# Patient Record
Sex: MALE | Race: WHITE | NOT HISPANIC OR LATINO | URBAN - METROPOLITAN AREA
[De-identification: names, ages, dates, MRNs, and addresses within clinical notes are randomized per-mention and may not be internally consistent; named-entity substitution may affect disease eponyms.]

---

## 2019-12-17 LAB
CHOLEST SERPL-MCNC: 193 MG/DL
HDLC SERPL-MCNC: 60 MG/DL
HIV 1+2 AB+HIV1 P24 AG SERPL QL IA: NONREACTIVE
LDLC SERPL CALC-MCNC: 108 MG/DL
NONHDLC SERPL-MCNC: 133 MG/DL
TRIGL SERPL-MCNC: 123 MG/DL

## 2021-04-07 NOTE — PROGRESS NOTES
ASSESSMENT AND PLAN:     COUNSELING:   Reviewed preventive health counseling, as reflected in patient instructions       Regular exercise       Healthy diet/nutrition       Consider Hep C screening for all patients one time for ages 18-79 years    (Z00.00) Visit for well man health check  (primary encounter diagnosis)  Comment: Age appropriate screening and preventive services provided. Up to date on cholesterol screening. Had borderline fasting glucose in 2019 - will repeat. We discussed health risks associated with BMI >30, also that BMI is an imprecise marker of health. I think his usual weight range of 185-200 will likely be okay unless his glucose remains elevated  His BP was in the Stage 1 hypertension range but was normal in 2019. We discussed weighs of lowering BP including weight loss, exercise, healthy diet, reducing sodium intake, increasing potassium intake.   He's interested in counseling around handling emotions with his daughter. Gave [facility] [place] info.  Plan: Basic metabolic panel, Hepatitis C Screen         Reflex to HCV RNA Quant and Genotype         Henrik Babb MD  [facility]  2021, 4:30 PM      SUBJECTIVE:   Yuniel is a 36 year old male who presents to clinic today to establish care and for annual wellness exam.     # Health Maintenance  - HIV Screenin19 nonreactive  - Hep C Screening: do with next labs  - BP: 19 118/68  BP Readings from Last 3 Encounters:   21 129/84   - Cholesterol: 19 Chol 193, HDL 60, , , non-  - Diabetes Screenin19 fasting glucose 100  - (+) seatbelt use, (+) helmet, (+) smoke detector  - Exercise: averages 27,000-30,000 steps a day mostly with work, also does elliptical 2-3 times a week (30 minutes at a time).   - tries to be vegetable and protein heavy (usually chicken or seafood, occasional beef/pork), tries to limit carb intake  - weight tends to be 185-200 depending on how strict on his diet he  is  - was on pills for weight loss in 2018    Today's PHQ-2 Score:   PHQ-2 ( 1999 [employer]) 4/9/2021   Q1: Little interest or pleasure in doing things 0   Q2: Feeling down, depressed or hopeless 0   PHQ-2 Score 0     Review of Systems:   Constitutional - no fevers, chills, night sweats, unintentional weight loss/gain   Eyes - no vision concerns   Ears/Nose/Throat - no hearing concerns, no dysphagia/odynophagia   Cardiovascular - no chest pain, palpitations   Pulmonary - no shortness of breath, wheezing, coughing   GI - no abdominal pain, constipation, diarrhea, nausea, vomiting    - no dysuria, polyuria, hematuria   Musculoskeletal - no joint or muscle pain  Integument - no rash   Neuro - no weakness, numbness, paresthesia   Heme - no easy bruising/bleeding   Endocrine - no polyuria, weight loss/gain, dry skin, excessive sweating, hair loss   Psychiatric - no feelings of depressed mood or anhedonia in past 2 weeks   Allergic/Immunologic - no history of anaphylaxis, no history of recurrent infections    History reviewed. No pertinent past medical history.  Past Surgical History:   Procedure Laterality Date     AS OPEN TX FRACTURE PHALANX/PHALANGES NOT GREAT TOE Right     Right 5th finger, may still have hardware in place     Family History   Problem Relation Age of Onset     No Known Problems Mother      Diabetes Type 1 Father      Hyperlipidemia Father      Nephrolithiasis Father      Diabetes Type 2  Paternal Grandmother      Coronary Artery Disease Paternal Grandfather 45     Spina bifida Sister      Hydrocephalus Sister      Prostate Cancer No family hx of      Colon Cancer No family hx of      Social History     Tobacco Use     Smoking status: Never Smoker     Smokeless tobacco: Never Used   Substance Use Topics     Alcohol use: Yes     Frequency: 2-3 times a week     Drinks per session: 1 or 2     Binge frequency: Never     Drug use: Never     Social History     Social History Narrative    Works at [employer], stocking and loading trucks    Lives with wife and daughter       Current Outpatient Medications   Medication     CALCIUM PO     magnesium 250 MG tablet     multivitamin w/minerals (MULTI-VITAMIN) tablet     vitamin (B COMPLEX-C) tablet     Vitamin D, Cholecalciferol, 25 MCG (1000 UT) CAPS     No current facility-administered medications for this visit.      I have reviewed the patient's past medical, surgical, family, and social history.     OBJECTIVE:   /84 (BP Location: Right arm, Patient Position: Sitting, Cuff Size: Adult Large)   Pulse 69   Temp 97  F (36.1  C) (Skin)   Resp 14   Ht 1.738 m (5' 8.43\")   Wt 92.5 kg (204 lb)   SpO2 98%   BMI 30.63 kg/m      Constitutional: well-appearing, appears stated age  Eyes: conjunctivae without erythema, sclera anicteric. Pupils equal, round, and reactive to light.   ENT: oropharynx clear, TM grey bilateral  Cardiac: regular rate and rhythm, normal S1/S2, no murmur/rubs/gallops  Respiratory: lungs clear to auscultation bilaterally, normal work of breathing, no wheezes/crackles  GI: abdomen soft, non-tender, non-distended  Extremities: warm and well perfused, radial pulses 2+ and equal, cap refill brisk.  Lymph: no cervical or supraclavicular lymphadenopathy  Skin: no rashes, lesions, or wounds  Psych: affect is full and appropriate, speech is fluent and non-pressured  "

## 2021-04-09 ENCOUNTER — OFFICE VISIT (OUTPATIENT)
Dept: FAMILY MEDICINE | Facility: CLINIC | Age: 36
End: 2021-04-09
Payer: COMMERCIAL

## 2021-04-09 VITALS
OXYGEN SATURATION: 98 % | DIASTOLIC BLOOD PRESSURE: 84 MMHG | BODY MASS INDEX: 30.92 KG/M2 | RESPIRATION RATE: 14 BRPM | TEMPERATURE: 97 F | WEIGHT: 204 LBS | HEART RATE: 69 BPM | SYSTOLIC BLOOD PRESSURE: 129 MMHG | HEIGHT: 68 IN

## 2021-04-09 DIAGNOSIS — Z00.00 VISIT FOR WELL MAN HEALTH CHECK: Primary | ICD-10-CM

## 2021-04-09 RX ORDER — MULTIVITAMIN WITH IRON
1 TABLET ORAL DAILY
COMMUNITY

## 2021-04-09 RX ORDER — MULTIPLE VITAMINS W/ MINERALS TAB 9MG-400MCG
1 TAB ORAL DAILY
COMMUNITY

## 2021-04-09 RX ORDER — CALCIUM POLYCARBOPHIL 625 MG
TABLET ORAL DAILY
COMMUNITY
End: 2021-04-09

## 2021-04-09 RX ORDER — MULTIVITAMIN WITH IRON
1 TABLET ORAL DAILY
COMMUNITY
End: 2022-04-23

## 2021-04-09 RX ORDER — FAMOTIDINE 20 MG
TABLET ORAL
COMMUNITY
End: 2022-04-23

## 2021-04-09 SDOH — HEALTH STABILITY: MENTAL HEALTH: HOW OFTEN DO YOU HAVE 6 OR MORE DRINKS ON ONE OCCASION?: NEVER

## 2021-04-09 SDOH — HEALTH STABILITY: MENTAL HEALTH: HOW OFTEN DO YOU HAVE A DRINK CONTAINING ALCOHOL?: 2-3 TIMES A WEEK

## 2021-04-09 SDOH — HEALTH STABILITY: MENTAL HEALTH: HOW MANY STANDARD DRINKS CONTAINING ALCOHOL DO YOU HAVE ON A TYPICAL DAY?: 1 OR 2

## 2021-04-09 ASSESSMENT — MIFFLIN-ST. JEOR: SCORE: 1831.59

## 2021-04-09 NOTE — NURSING NOTE
"36 year old  Chief Complaint   Patient presents with     Physical       Blood pressure 129/84, pulse 69, temperature 97  F (36.1  C), temperature source Skin, resp. rate 14, height 1.738 m (5' 8.43\"), weight 92.5 kg (204 lb), SpO2 98 %. Body mass index is 30.63 kg/m .  There is no problem list on file for this patient.      Wt Readings from Last 2 Encounters:   04/09/21 92.5 kg (204 lb)     BP Readings from Last 3 Encounters:   04/09/21 129/84         Current Outpatient Medications   Medication     Calcium Polycarbophil (FIBER) 625 MG tablet     magnesium 250 MG tablet     multivitamin w/minerals (MULTI-VITAMIN) tablet     vitamin (B COMPLEX-C) tablet     Vitamin D, Cholecalciferol, 25 MCG (1000 UT) CAPS     No current facility-administered medications for this visit.        Social History     Tobacco Use     Smoking status: Never Smoker     Smokeless tobacco: Never Used   Substance Use Topics     Alcohol use: Yes     Frequency: 2-3 times a week     Drinks per session: 1 or 2     Binge frequency: Never     Drug use: Never       There are no preventive care reminders to display for this patient.    No results found for: PAP      April 9, 2021 4:01 PM    "

## 2021-04-22 ENCOUNTER — VIRTUAL VISIT (OUTPATIENT)
Dept: BEHAVIORAL HEALTH | Facility: CLINIC | Age: 36
End: 2021-04-22
Payer: COMMERCIAL

## 2021-04-22 DIAGNOSIS — F43.22 ADJUSTMENT DISORDER WITH ANXIETY: Primary | ICD-10-CM

## 2021-04-22 ASSESSMENT — COLUMBIA-SUICIDE SEVERITY RATING SCALE - C-SSRS
1. IN THE PAST MONTH, HAVE YOU WISHED YOU WERE DEAD OR WISHED YOU COULD GO TO SLEEP AND NOT WAKE UP?: NO
6. HAVE YOU EVER DONE ANYTHING, STARTED TO DO ANYTHING, OR PREPARED TO DO ANYTHING TO END YOUR LIFE?: NO
TOTAL  NUMBER OF INTERRUPTED ATTEMPTS LIFETIME: NO
3. HAVE YOU BEEN THINKING ABOUT HOW YOU MIGHT KILL YOURSELF?: NO
2. HAVE YOU ACTUALLY HAD ANY THOUGHTS OF KILLING YOURSELF?: NO
6. HAVE YOU EVER DONE ANYTHING, STARTED TO DO ANYTHING, OR PREPARED TO DO ANYTHING TO END YOUR LIFE?: NO
5. HAVE YOU STARTED TO WORK OUT OR WORKED OUT THE DETAILS OF HOW TO KILL YOURSELF? DO YOU INTEND TO CARRY OUT THIS PLAN?: NO
TOTAL  NUMBER OF INTERRUPTED ATTEMPTS PAST 3 MONTHS: NO
2. HAVE YOU ACTUALLY HAD ANY THOUGHTS OF KILLING YOURSELF LIFETIME?: NO
4. HAVE YOU HAD THESE THOUGHTS AND HAD SOME INTENTION OF ACTING ON THEM?: NO
ATTEMPT PAST THREE MONTHS: NO
ATTEMPT LIFETIME: NO
TOTAL  NUMBER OF ABORTED OR SELF INTERRUPTED ATTEMPTS PAST LIFETIME: NO
1. IN THE PAST MONTH, HAVE YOU WISHED YOU WERE DEAD OR WISHED YOU COULD GO TO SLEEP AND NOT WAKE UP?: NO
5. HAVE YOU STARTED TO WORK OUT OR WORKED OUT THE DETAILS OF HOW TO KILL YOURSELF? DO YOU INTEND TO CARRY OUT THIS PLAN?: NO
TOTAL  NUMBER OF ABORTED OR SELF INTERRUPTED ATTEMPTS PAST 3 MONTHS: NO
4. HAVE YOU HAD THESE THOUGHTS AND HAD SOME INTENTION OF ACTING ON THEM?: NO

## 2021-04-22 ASSESSMENT — ANXIETY QUESTIONNAIRES
5. BEING SO RESTLESS THAT IT IS HARD TO SIT STILL: NOT AT ALL
GAD7 TOTAL SCORE: 3
7. FEELING AFRAID AS IF SOMETHING AWFUL MIGHT HAPPEN: NOT AT ALL
IF YOU CHECKED OFF ANY PROBLEMS ON THIS QUESTIONNAIRE, HOW DIFFICULT HAVE THESE PROBLEMS MADE IT FOR YOU TO DO YOUR WORK, TAKE CARE OF THINGS AT HOME, OR GET ALONG WITH OTHER PEOPLE: NOT DIFFICULT AT ALL
1. FEELING NERVOUS, ANXIOUS, OR ON EDGE: SEVERAL DAYS
3. WORRYING TOO MUCH ABOUT DIFFERENT THINGS: NOT AT ALL
2. NOT BEING ABLE TO STOP OR CONTROL WORRYING: NOT AT ALL
6. BECOMING EASILY ANNOYED OR IRRITABLE: SEVERAL DAYS

## 2021-04-22 ASSESSMENT — PATIENT HEALTH QUESTIONNAIRE - PHQ9
SUM OF ALL RESPONSES TO PHQ QUESTIONS 1-9: 1
5. POOR APPETITE OR OVEREATING: SEVERAL DAYS
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
## 2021-04-23 ASSESSMENT — ANXIETY QUESTIONNAIRES: GAD7 TOTAL SCORE: 3

## 2021-04-24 DIAGNOSIS — Z00.00 VISIT FOR WELL MAN HEALTH CHECK: ICD-10-CM

## 2021-04-24 LAB
ANION GAP SERPL CALCULATED.3IONS-SCNC: 4 MMOL/L (ref 3–14)
BUN SERPL-MCNC: 18 MG/DL (ref 7–30)
CALCIUM SERPL-MCNC: 9 MG/DL (ref 8.5–10.1)
CHLORIDE SERPL-SCNC: 108 MMOL/L (ref 94–109)
CO2 SERPL-SCNC: 27 MMOL/L (ref 20–32)
CREAT SERPL-MCNC: 0.83 MG/DL (ref 0.66–1.25)
GFR SERPL CREATININE-BSD FRML MDRD: >90 ML/MIN/{1.73_M2}
GLUCOSE SERPL-MCNC: 91 MG/DL (ref 70–99)
POTASSIUM SERPL-SCNC: 4.1 MMOL/L (ref 3.4–5.3)
SODIUM SERPL-SCNC: 139 MMOL/L (ref 133–144)

## 2021-04-24 PROCEDURE — 36415 COLL VENOUS BLD VENIPUNCTURE: CPT | Performed by: FAMILY MEDICINE

## 2021-04-24 PROCEDURE — 86803 HEPATITIS C AB TEST: CPT | Performed by: FAMILY MEDICINE

## 2021-04-24 PROCEDURE — 80048 BASIC METABOLIC PNL TOTAL CA: CPT | Performed by: FAMILY MEDICINE

## 2021-04-27 LAB — HCV AB SERPL QL IA: NONREACTIVE

## 2021-05-06 ENCOUNTER — VIRTUAL VISIT (OUTPATIENT)
Dept: BEHAVIORAL HEALTH | Facility: CLINIC | Age: 36
End: 2021-05-06
Payer: COMMERCIAL

## 2021-05-06 DIAGNOSIS — F43.22 ADJUSTMENT DISORDER WITH ANXIETY: Primary | ICD-10-CM

## 2021-05-06 ASSESSMENT — ANXIETY QUESTIONNAIRES
1. FEELING NERVOUS, ANXIOUS, OR ON EDGE: SEVERAL DAYS
5. BEING SO RESTLESS THAT IT IS HARD TO SIT STILL: NOT AT ALL
GAD7 TOTAL SCORE: 1
7. FEELING AFRAID AS IF SOMETHING AWFUL MIGHT HAPPEN: NOT AT ALL
6. BECOMING EASILY ANNOYED OR IRRITABLE: NOT AT ALL
GAD7 TOTAL SCORE: 1
4. TROUBLE RELAXING: NOT AT ALL
2. NOT BEING ABLE TO STOP OR CONTROL WORRYING: NOT AT ALL
3. WORRYING TOO MUCH ABOUT DIFFERENT THINGS: NOT AT ALL
GAD7 TOTAL SCORE: 1
7. FEELING AFRAID AS IF SOMETHING AWFUL MIGHT HAPPEN: NOT AT ALL

## 2021-05-06 ASSESSMENT — PATIENT HEALTH QUESTIONNAIRE - PHQ9
10. IF YOU CHECKED OFF ANY PROBLEMS, HOW DIFFICULT HAVE THESE PROBLEMS MADE IT FOR YOU TO DO YOUR WORK, TAKE CARE OF THINGS AT HOME, OR GET ALONG WITH OTHER PEOPLE: NOT DIFFICULT AT ALL
SUM OF ALL RESPONSES TO PHQ QUESTIONS 1-9: 1
SUM OF ALL RESPONSES TO PHQ QUESTIONS 1-9: 1
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
## 2021-05-07 ASSESSMENT — PATIENT HEALTH QUESTIONNAIRE - PHQ9: SUM OF ALL RESPONSES TO PHQ QUESTIONS 1-9: 1

## 2021-05-07 ASSESSMENT — ANXIETY QUESTIONNAIRES: GAD7 TOTAL SCORE: 1

## 2021-05-13 ENCOUNTER — VIRTUAL VISIT (OUTPATIENT)
Dept: BEHAVIORAL HEALTH | Facility: CLINIC | Age: 36
End: 2021-05-13
Payer: COMMERCIAL

## 2021-05-13 DIAGNOSIS — F43.22 ADJUSTMENT DISORDER WITH ANXIOUS MOOD: Primary | ICD-10-CM

## 2021-05-13 ASSESSMENT — PATIENT HEALTH QUESTIONNAIRE - PHQ9
10. IF YOU CHECKED OFF ANY PROBLEMS, HOW DIFFICULT HAVE THESE PROBLEMS MADE IT FOR YOU TO DO YOUR WORK, TAKE CARE OF THINGS AT HOME, OR GET ALONG WITH OTHER PEOPLE: NOT DIFFICULT AT ALL
SUM OF ALL RESPONSES TO PHQ QUESTIONS 1-9: 0
SUM OF ALL RESPONSES TO PHQ QUESTIONS 1-9: 0
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
## 2021-05-14 ASSESSMENT — PATIENT HEALTH QUESTIONNAIRE - PHQ9: SUM OF ALL RESPONSES TO PHQ QUESTIONS 1-9: 0

## 2021-05-27 ENCOUNTER — VIRTUAL VISIT (OUTPATIENT)
Dept: BEHAVIORAL HEALTH | Facility: CLINIC | Age: 36
End: 2021-05-27
Payer: COMMERCIAL

## 2021-05-27 DIAGNOSIS — F43.22 ADJUSTMENT DISORDER WITH ANXIETY: Primary | ICD-10-CM

## 2021-05-27 ASSESSMENT — ANXIETY QUESTIONNAIRES
GAD7 TOTAL SCORE: 0
6. BECOMING EASILY ANNOYED OR IRRITABLE: NOT AT ALL
1. FEELING NERVOUS, ANXIOUS, OR ON EDGE: NOT AT ALL
7. FEELING AFRAID AS IF SOMETHING AWFUL MIGHT HAPPEN: NOT AT ALL
5. BEING SO RESTLESS THAT IT IS HARD TO SIT STILL: NOT AT ALL
7. FEELING AFRAID AS IF SOMETHING AWFUL MIGHT HAPPEN: NOT AT ALL
GAD7 TOTAL SCORE: 0
3. WORRYING TOO MUCH ABOUT DIFFERENT THINGS: NOT AT ALL
2. NOT BEING ABLE TO STOP OR CONTROL WORRYING: NOT AT ALL
GAD7 TOTAL SCORE: 0
4. TROUBLE RELAXING: NOT AT ALL

## 2021-05-27 ASSESSMENT — PATIENT HEALTH QUESTIONNAIRE - PHQ9
SUM OF ALL RESPONSES TO PHQ QUESTIONS 1-9: 0
10. IF YOU CHECKED OFF ANY PROBLEMS, HOW DIFFICULT HAVE THESE PROBLEMS MADE IT FOR YOU TO DO YOUR WORK, TAKE CARE OF THINGS AT HOME, OR GET ALONG WITH OTHER PEOPLE: NOT DIFFICULT AT ALL
SUM OF ALL RESPONSES TO PHQ QUESTIONS 1-9: 0
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
## 2021-05-28 ASSESSMENT — ANXIETY QUESTIONNAIRES: GAD7 TOTAL SCORE: 0

## 2021-05-28 ASSESSMENT — PATIENT HEALTH QUESTIONNAIRE - PHQ9: SUM OF ALL RESPONSES TO PHQ QUESTIONS 1-9: 0
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
## 2021-06-10 ENCOUNTER — VIRTUAL VISIT (OUTPATIENT)
Dept: BEHAVIORAL HEALTH | Facility: CLINIC | Age: 36
End: 2021-06-10
Payer: COMMERCIAL

## 2021-06-10 DIAGNOSIS — F43.22 ADJUSTMENT DISORDER WITH ANXIETY: ICD-10-CM

## 2021-06-10 ASSESSMENT — ANXIETY QUESTIONNAIRES
3. WORRYING TOO MUCH ABOUT DIFFERENT THINGS: NOT AT ALL
GAD7 TOTAL SCORE: 0
4. TROUBLE RELAXING: NOT AT ALL
GAD7 TOTAL SCORE: 0
6. BECOMING EASILY ANNOYED OR IRRITABLE: NOT AT ALL
2. NOT BEING ABLE TO STOP OR CONTROL WORRYING: NOT AT ALL
1. FEELING NERVOUS, ANXIOUS, OR ON EDGE: NOT AT ALL
7. FEELING AFRAID AS IF SOMETHING AWFUL MIGHT HAPPEN: NOT AT ALL
7. FEELING AFRAID AS IF SOMETHING AWFUL MIGHT HAPPEN: NOT AT ALL
GAD7 TOTAL SCORE: 0
5. BEING SO RESTLESS THAT IT IS HARD TO SIT STILL: NOT AT ALL

## 2021-06-10 ASSESSMENT — PATIENT HEALTH QUESTIONNAIRE - PHQ9
SUM OF ALL RESPONSES TO PHQ QUESTIONS 1-9: 0
SUM OF ALL RESPONSES TO PHQ QUESTIONS 1-9: 0

## 2021-06-11 ASSESSMENT — ANXIETY QUESTIONNAIRES: GAD7 TOTAL SCORE: 0

## 2021-06-11 ASSESSMENT — PATIENT HEALTH QUESTIONNAIRE - PHQ9: SUM OF ALL RESPONSES TO PHQ QUESTIONS 1-9: 0
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
## 2021-07-08 ENCOUNTER — VIRTUAL VISIT (OUTPATIENT)
Dept: BEHAVIORAL HEALTH | Facility: CLINIC | Age: 36
End: 2021-07-08
Payer: COMMERCIAL

## 2021-07-08 DIAGNOSIS — F43.22 ADJUSTMENT DISORDER WITH ANXIETY: Primary | ICD-10-CM

## 2021-07-08 ASSESSMENT — ANXIETY QUESTIONNAIRES
4. TROUBLE RELAXING: SEVERAL DAYS
7. FEELING AFRAID AS IF SOMETHING AWFUL MIGHT HAPPEN: NOT AT ALL
5. BEING SO RESTLESS THAT IT IS HARD TO SIT STILL: NOT AT ALL
2. NOT BEING ABLE TO STOP OR CONTROL WORRYING: SEVERAL DAYS
1. FEELING NERVOUS, ANXIOUS, OR ON EDGE: SEVERAL DAYS
7. FEELING AFRAID AS IF SOMETHING AWFUL MIGHT HAPPEN: NOT AT ALL
3. WORRYING TOO MUCH ABOUT DIFFERENT THINGS: NOT AT ALL
GAD7 TOTAL SCORE: 3
6. BECOMING EASILY ANNOYED OR IRRITABLE: NOT AT ALL

## 2021-07-08 ASSESSMENT — PATIENT HEALTH QUESTIONNAIRE - PHQ9
SUM OF ALL RESPONSES TO PHQ QUESTIONS 1-9: 1
SUM OF ALL RESPONSES TO PHQ QUESTIONS 1-9: 1
10. IF YOU CHECKED OFF ANY PROBLEMS, HOW DIFFICULT HAVE THESE PROBLEMS MADE IT FOR YOU TO DO YOUR WORK, TAKE CARE OF THINGS AT HOME, OR GET ALONG WITH OTHER PEOPLE: NOT DIFFICULT AT ALL

## 2021-07-09 ASSESSMENT — ANXIETY QUESTIONNAIRES: GAD7 TOTAL SCORE: 3

## 2021-07-09 ASSESSMENT — PATIENT HEALTH QUESTIONNAIRE - PHQ9: SUM OF ALL RESPONSES TO PHQ QUESTIONS 1-9: 1

## 2021-07-16 ENCOUNTER — NURSE TRIAGE (OUTPATIENT)
Dept: NURSING | Facility: CLINIC | Age: 36
End: 2021-07-16
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
## 2021-07-19 ASSESSMENT — ANXIETY QUESTIONNAIRES
6. BECOMING EASILY ANNOYED OR IRRITABLE: NOT AT ALL
5. BEING SO RESTLESS THAT IT IS HARD TO SIT STILL: NOT AT ALL
GAD7 TOTAL SCORE: 0
7. FEELING AFRAID AS IF SOMETHING AWFUL MIGHT HAPPEN: NOT AT ALL
8. IF YOU CHECKED OFF ANY PROBLEMS, HOW DIFFICULT HAVE THESE MADE IT FOR YOU TO DO YOUR WORK, TAKE CARE OF THINGS AT HOME, OR GET ALONG WITH OTHER PEOPLE?: NOT DIFFICULT AT ALL
GAD7 TOTAL SCORE: 0
2. NOT BEING ABLE TO STOP OR CONTROL WORRYING: NOT AT ALL
7. FEELING AFRAID AS IF SOMETHING AWFUL MIGHT HAPPEN: NOT AT ALL
1. FEELING NERVOUS, ANXIOUS, OR ON EDGE: NOT AT ALL
GAD7 TOTAL SCORE: 0
3. WORRYING TOO MUCH ABOUT DIFFERENT THINGS: NOT AT ALL
4. TROUBLE RELAXING: NOT AT ALL

## 2021-07-19 ASSESSMENT — PATIENT HEALTH QUESTIONNAIRE - PHQ9
SUM OF ALL RESPONSES TO PHQ QUESTIONS 1-9: 2
SUM OF ALL RESPONSES TO PHQ QUESTIONS 1-9: 2
10. IF YOU CHECKED OFF ANY PROBLEMS, HOW DIFFICULT HAVE THESE PROBLEMS MADE IT FOR YOU TO DO YOUR WORK, TAKE CARE OF THINGS AT HOME, OR GET ALONG WITH OTHER PEOPLE: NOT DIFFICULT AT ALL
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
## 2021-07-20 ENCOUNTER — VIRTUAL VISIT (OUTPATIENT)
Dept: BEHAVIORAL HEALTH | Facility: CLINIC | Age: 36
End: 2021-07-20
Payer: COMMERCIAL

## 2021-07-20 DIAGNOSIS — F43.22 ADJUSTMENT DISORDER WITH ANXIETY: Primary | ICD-10-CM

## 2021-07-20 ASSESSMENT — PATIENT HEALTH QUESTIONNAIRE - PHQ9: SUM OF ALL RESPONSES TO PHQ QUESTIONS 1-9: 2

## 2021-07-20 ASSESSMENT — ANXIETY QUESTIONNAIRES: GAD7 TOTAL SCORE: 0
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
## 2021-07-30 ENCOUNTER — VIRTUAL VISIT (OUTPATIENT)
Dept: BEHAVIORAL HEALTH | Facility: CLINIC | Age: 36
End: 2021-07-30
Payer: COMMERCIAL

## 2021-07-30 DIAGNOSIS — F43.22 ADJUSTMENT DISORDER WITH ANXIETY: Primary | ICD-10-CM

## 2021-07-31 ASSESSMENT — PATIENT HEALTH QUESTIONNAIRE - PHQ9: SUM OF ALL RESPONSES TO PHQ QUESTIONS 1-9: 0

## 2021-08-02 ENCOUNTER — TELEPHONE (OUTPATIENT)
Dept: FAMILY MEDICINE | Facility: CLINIC | Age: 36
End: 2021-08-02

## 2021-08-02 NOTE — TELEPHONE ENCOUNTER
Patient quit his job and is applying for unemployment. There is no requirement from unemployment office for a letter. Advised patient to apply for his unemployment as directed by the application process. No letter at this time.   Ella Macedo MS RN-BC  08/02/21  1:54 PM

## 2021-08-02 NOTE — TELEPHONE ENCOUNTER
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 He is wondering if we are able to write a brief letter stating that he is not able to hold a full time job at this time due to increased need in care for her. Call him with questions.    [place]
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
## 2021-08-12 ENCOUNTER — VIRTUAL VISIT (OUTPATIENT)
Dept: BEHAVIORAL HEALTH | Facility: CLINIC | Age: 36
End: 2021-08-12
Payer: COMMERCIAL

## 2021-08-12 DIAGNOSIS — F43.22 ADJUSTMENT DISORDER WITH ANXIETY: Primary | ICD-10-CM

## 2021-08-12 ASSESSMENT — ANXIETY QUESTIONNAIRES
GAD7 TOTAL SCORE: 0
7. FEELING AFRAID AS IF SOMETHING AWFUL MIGHT HAPPEN: NOT AT ALL
2. NOT BEING ABLE TO STOP OR CONTROL WORRYING: NOT AT ALL
1. FEELING NERVOUS, ANXIOUS, OR ON EDGE: NOT AT ALL
4. TROUBLE RELAXING: NOT AT ALL
GAD7 TOTAL SCORE: 0
3. WORRYING TOO MUCH ABOUT DIFFERENT THINGS: NOT AT ALL
GAD7 TOTAL SCORE: 0
6. BECOMING EASILY ANNOYED OR IRRITABLE: NOT AT ALL
5. BEING SO RESTLESS THAT IT IS HARD TO SIT STILL: NOT AT ALL
7. FEELING AFRAID AS IF SOMETHING AWFUL MIGHT HAPPEN: NOT AT ALL

## 2021-08-12 ASSESSMENT — PATIENT HEALTH QUESTIONNAIRE - PHQ9
SUM OF ALL RESPONSES TO PHQ QUESTIONS 1-9: 1
SUM OF ALL RESPONSES TO PHQ QUESTIONS 1-9: 1

## 2021-08-13 ASSESSMENT — ANXIETY QUESTIONNAIRES: GAD7 TOTAL SCORE: 0

## 2021-08-13 ASSESSMENT — PATIENT HEALTH QUESTIONNAIRE - PHQ9: SUM OF ALL RESPONSES TO PHQ QUESTIONS 1-9: 1
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
## 2021-08-17 ENCOUNTER — VIRTUAL VISIT (OUTPATIENT)
Dept: BEHAVIORAL HEALTH | Facility: CLINIC | Age: 36
End: 2021-08-17
Payer: COMMERCIAL

## 2021-08-17 DIAGNOSIS — F43.22 ADJUSTMENT DISORDER WITH ANXIETY: Primary | ICD-10-CM

## 2021-08-17 ASSESSMENT — PATIENT HEALTH QUESTIONNAIRE - PHQ9
SUM OF ALL RESPONSES TO PHQ QUESTIONS 1-9: 0
SUM OF ALL RESPONSES TO PHQ QUESTIONS 1-9: 0

## 2021-08-18 ASSESSMENT — PATIENT HEALTH QUESTIONNAIRE - PHQ9: SUM OF ALL RESPONSES TO PHQ QUESTIONS 1-9: 0
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
## 2021-09-14 ENCOUNTER — VIRTUAL VISIT (OUTPATIENT)
Dept: BEHAVIORAL HEALTH | Facility: CLINIC | Age: 36
End: 2021-09-14
Payer: COMMERCIAL

## 2021-09-14 DIAGNOSIS — F43.22 ADJUSTMENT DISORDER WITH ANXIETY: Primary | ICD-10-CM

## 2021-09-14 ASSESSMENT — PATIENT HEALTH QUESTIONNAIRE - PHQ9
SUM OF ALL RESPONSES TO PHQ QUESTIONS 1-9: 1
10. IF YOU CHECKED OFF ANY PROBLEMS, HOW DIFFICULT HAVE THESE PROBLEMS MADE IT FOR YOU TO DO YOUR WORK, TAKE CARE OF THINGS AT HOME, OR GET ALONG WITH OTHER PEOPLE: NOT DIFFICULT AT ALL
SUM OF ALL RESPONSES TO PHQ QUESTIONS 1-9: 1

## 2021-09-14 ASSESSMENT — ANXIETY QUESTIONNAIRES
7. FEELING AFRAID AS IF SOMETHING AWFUL MIGHT HAPPEN: NOT AT ALL
GAD7 TOTAL SCORE: 0
7. FEELING AFRAID AS IF SOMETHING AWFUL MIGHT HAPPEN: NOT AT ALL
GAD7 TOTAL SCORE: 0
GAD7 TOTAL SCORE: 0
4. TROUBLE RELAXING: NOT AT ALL
1. FEELING NERVOUS, ANXIOUS, OR ON EDGE: NOT AT ALL
2. NOT BEING ABLE TO STOP OR CONTROL WORRYING: NOT AT ALL
5. BEING SO RESTLESS THAT IT IS HARD TO SIT STILL: NOT AT ALL
3. WORRYING TOO MUCH ABOUT DIFFERENT THINGS: NOT AT ALL
6. BECOMING EASILY ANNOYED OR IRRITABLE: NOT AT ALL
8. IF YOU CHECKED OFF ANY PROBLEMS, HOW DIFFICULT HAVE THESE MADE IT FOR YOU TO DO YOUR WORK, TAKE CARE OF THINGS AT HOME, OR GET ALONG WITH OTHER PEOPLE?: NOT DIFFICULT AT ALL

## 2021-09-15 ASSESSMENT — ANXIETY QUESTIONNAIRES: GAD7 TOTAL SCORE: 0

## 2021-09-15 ASSESSMENT — PATIENT HEALTH QUESTIONNAIRE - PHQ9: SUM OF ALL RESPONSES TO PHQ QUESTIONS 1-9: 1

## 2021-10-17 ENCOUNTER — HEALTH MAINTENANCE LETTER (OUTPATIENT)
Age: 36
End: 2021-10-17

## 2021-11-08 ENCOUNTER — NURSE TRIAGE (OUTPATIENT)
Dept: FAMILY MEDICINE | Facility: CLINIC | Age: 36
End: 2021-11-08
Payer: COMMERCIAL

## 2021-11-08 NOTE — TELEPHONE ENCOUNTER
"Patient c/o severe lower back pain with onset yesterday after \"tweaking\" his back. He is unable to stand up, is lying on the floor because that is only place he can be. He rates pain as \"severe\" even after taking acetaminophen and ibuprofen. Denies loss of lbadder bowel control, numbness in groin or rectal area, abdominal pain, weakness of leg or foot. He reports that he is not able to get up to get medical help due to severity of pain.     Reason for Disposition    Unable to walk    Protocols used: BACK PAIN-A-    Patient plans to call 911 for transportation to ED.   Ella Macedo MS RN-BC  11/08/21  10:00 AM      "

## 2021-11-08 NOTE — TELEPHONE ENCOUNTER
Who is calling? Patient  Reason for Call: Requests to speak with a RN for severe back pain
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
## 2021-11-16 ENCOUNTER — VIRTUAL VISIT (OUTPATIENT)
Dept: BEHAVIORAL HEALTH | Facility: CLINIC | Age: 36
End: 2021-11-16
Payer: COMMERCIAL

## 2021-11-16 DIAGNOSIS — F43.22 ADJUSTMENT DISORDER WITH ANXIETY: Primary | ICD-10-CM

## 2021-11-16 ASSESSMENT — ANXIETY QUESTIONNAIRES
GAD7 TOTAL SCORE: 0
4. TROUBLE RELAXING: NOT AT ALL
GAD7 TOTAL SCORE: 0
7. FEELING AFRAID AS IF SOMETHING AWFUL MIGHT HAPPEN: NOT AT ALL
3. WORRYING TOO MUCH ABOUT DIFFERENT THINGS: NOT AT ALL
GAD7 TOTAL SCORE: 0
2. NOT BEING ABLE TO STOP OR CONTROL WORRYING: NOT AT ALL
7. FEELING AFRAID AS IF SOMETHING AWFUL MIGHT HAPPEN: NOT AT ALL
6. BECOMING EASILY ANNOYED OR IRRITABLE: NOT AT ALL
1. FEELING NERVOUS, ANXIOUS, OR ON EDGE: NOT AT ALL
5. BEING SO RESTLESS THAT IT IS HARD TO SIT STILL: NOT AT ALL

## 2021-11-16 ASSESSMENT — PATIENT HEALTH QUESTIONNAIRE - PHQ9
SUM OF ALL RESPONSES TO PHQ QUESTIONS 1-9: 0
SUM OF ALL RESPONSES TO PHQ QUESTIONS 1-9: 0

## 2021-11-17 ASSESSMENT — ANXIETY QUESTIONNAIRES: GAD7 TOTAL SCORE: 0

## 2021-11-17 ASSESSMENT — PATIENT HEALTH QUESTIONNAIRE - PHQ9: SUM OF ALL RESPONSES TO PHQ QUESTIONS 1-9: 0

## 2022-04-23 NOTE — PROGRESS NOTES
ASSESSMENT AND PLAN:     COUNSELING:   Reviewed preventive health counseling, as reflected in patient instructions       Regular exercise       Healthy diet/nutrition    (Z00.00) Visit for well man health check  (primary encounter diagnosis)  Comment: Age appropriate screening and preventive services provided.   Discussed dietician services to help with weight loss - Yuniel has been successful with weight loss on his own in the past and wishes to try this again first.   Plan:     (R03.0) Elevated BP without diagnosis of hypertension  Comment: Newly elevated from previous readings.  Advised purchase of home BP machine and monitoring pressures at home - see AVS.   Plan: Basic metabolic panel          (Z11.3) Routine screening for STI (sexually transmitted infection)  Comment:   Plan: NEISSERIA GONORRHOEA PCR, CHLAMYDIA TRACHOMATIS        PCR          (Z13.1) Screening for diabetes mellitus  Comment:   Plan: Hemoglobin A1c          (Z13.220) Screening cholesterol level  Comment:   Plan: Lipid panel reflex to direct LDL Fasting          (Z78.9) Vegetarian diet  Comment: Advised B12 supplementation, adequate iron stores. Gave informational handout on iron requirements and common sources.   Plan:     (D17.30) Lipoma of skin and subcutaneous tissue  Comment: Low back nodule is very likely lipoma. Neck nodule also likely lipoma but could be lymph node given the location. Advised monitoring for another 3 months. If doesn't change, I would get an ultrasound to confirm this isn't lymphadenopathy.   Plan:     Henrik Babb MD  [facility]  04/25/2022, 9:45 AM      SUBJECTIVE:   Yuniel Ellis is a 37 year old male who presents to clinic today for an annual wellness exam.    # Adjustment Disorder  - lots of life stressors with family in the past year  - had been following with [facility] Shawn Ullrich, stopped in November  -  from wife who is planning move back to [place]  - splitting custody but with  "the move, Yuniel will be the primary caregiver for their daughter  - in a new relationship, going well    - has put on a good amount of weight over the past year  - has not been able to regularly exercise with work, , stressors, etc.      # Skin Concern  - has noticed a \"bump\" on his posterior neck for a few months now    - has also been told he has a \"fatty tumor\" on his lower back by another doctor    # Health Maintenance  - HIV Screenin19 nonreactive  - Hep C Screenin21 Ab nonreactive  - BP:   BP Readings from Last 3 Encounters:   22 (!) 149/96   21 129/84   - Cholesterol:   Recent Labs   Lab Test 19  0000   CHOL 193   HDL 60      TRIG 123     - Diabetes Screenin21 Fasting Glucose 91  - Exercise: has a stationary bike in bedroom now - planning to start using it 2-3 times a week, 30 minutes at a time    - has switched to a plant-based diet the past 3 months for health reasons  - some cheese and eggs    Today's PHQ-2 Score:   PHQ-2 (  [employer]) 2022   Q1: Little interest or pleasure in doing things 0 0   Q2: Feeling down, depressed or hopeless 0 0   PHQ-2 Score 0 0   PHQ-2 Total Score (12-17 Years)- Positive if 3 or more points; Administer PHQ-A if positive - 0     Review of Systems:   Constitutional - no fevers, chills, night sweats, unintentional weight loss/gain   Eyes - no vision concerns   Ears/Nose/Throat - no hearing concerns, no dysphagia/odynophagia   Cardiovascular - no chest pain, palpitations   Pulmonary - no shortness of breath, wheezing, coughing   GI - no abdominal pain, constipation, diarrhea, nausea, vomiting    - no dysuria, polyuria, hematuria   Musculoskeletal - no joint or muscle pain  Integument -  As above   Neuro - no weakness, numbness, paresthesia   Heme - no easy bruising/bleeding   Endocrine - no polyuria, weight loss/gain, dry skin, excessive sweating, hair loss   Psychiatric - no feelings of depressed mood or " "anhedonia in past 2 weeks   Allergic/Immunologic - no history of anaphylaxis, no history of recurrent infections    History reviewed. No pertinent past medical history.  Past Surgical History:   Procedure Laterality Date     AS OPEN TX FRACTURE PHALANX/PHALANGES NOT GREAT TOE Right     Right 5th finger, may still have hardware in place     Family History   Problem Relation Age of Onset     No Known Problems Mother      Diabetes Type 1 Father      Hyperlipidemia Father      Nephrolithiasis Father      Spina bifida Sister      Hydrocephalus Sister      Diabetes Type 2  Paternal Grandmother      Coronary Artery Disease Paternal Grandfather 45     Prostate Cancer No family hx of      Colon Cancer No family hx of      Cerebrovascular Disease No family hx of      Social History     Tobacco Use     Smoking status: Never Smoker     Smokeless tobacco: Never Used   Vaping Use     Vaping Use: Never used   Substance Use Topics     Alcohol use: Yes     Alcohol/week: 4.0 standard drinks     Types: 4 Standard drinks or equivalent per week     Comment: 3-4 drinks a week, spread out     Drug use: Yes     Types: Marijuana     Comment: Edibles, a couple of times a month     Social History     Social History Narrative    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     I have reviewed the patient's past medical, surgical, family, and social history.     OBJECTIVE:   BP (!) 149/96 (BP Location: Right arm, Patient Position: Sitting, Cuff Size: Adult Large)   Pulse 95   Temp 98.4  F (36.9  C) (Skin)   Resp 13   Ht 1.734 m (5' 8.25\")   Wt 111.7 kg (246 lb 4 oz)   SpO2 98%   BMI 37.17 kg/m      Constitutional: well-appearing, appears stated age  Eyes: conjunctivae without " erythema, sclera anicteric. Pupils equal, round, and reactive to light.   ENT: oropharynx clear, TM grey bilateral  Cardiac: regular rate and rhythm, normal S1/S2, no murmur/rubs/gallops  Respiratory: lungs clear to auscultation bilaterally, normal work of breathing, no wheezes/crackles  GI: abdomen soft, non-tender, non-distended  Extremities: warm and well perfused, radial pulses 2+ and equal, cap refill brisk.  Lymph: no cervical or supraclavicular lymphadenopathy  Skin: 3cm, firm, rubbery, mobile nodule posterior neck to left of midline. 2cm firm rubbery mobile nodule right lower back. Both non-tender with no overlying skin changes.   Psych: affect is full and appropriate, speech is fluent and non-pressured

## 2022-04-25 ENCOUNTER — OFFICE VISIT (OUTPATIENT)
Dept: FAMILY MEDICINE | Facility: CLINIC | Age: 37
End: 2022-04-25
Payer: COMMERCIAL

## 2022-04-25 VITALS
BODY MASS INDEX: 37.32 KG/M2 | TEMPERATURE: 98.4 F | OXYGEN SATURATION: 98 % | WEIGHT: 246.25 LBS | HEIGHT: 68 IN | HEART RATE: 80 BPM | SYSTOLIC BLOOD PRESSURE: 142 MMHG | DIASTOLIC BLOOD PRESSURE: 88 MMHG | RESPIRATION RATE: 13 BRPM

## 2022-04-25 DIAGNOSIS — Z13.220 SCREENING CHOLESTEROL LEVEL: ICD-10-CM

## 2022-04-25 DIAGNOSIS — R03.0 ELEVATED BP WITHOUT DIAGNOSIS OF HYPERTENSION: ICD-10-CM

## 2022-04-25 DIAGNOSIS — D17.30 LIPOMA OF SKIN AND SUBCUTANEOUS TISSUE: ICD-10-CM

## 2022-04-25 DIAGNOSIS — Z00.00 VISIT FOR WELL MAN HEALTH CHECK: Primary | ICD-10-CM

## 2022-04-25 DIAGNOSIS — Z78.9 VEGETARIAN DIET: Chronic | ICD-10-CM

## 2022-04-25 DIAGNOSIS — Z11.3 ROUTINE SCREENING FOR STI (SEXUALLY TRANSMITTED INFECTION): ICD-10-CM

## 2022-04-25 DIAGNOSIS — Z13.1 SCREENING FOR DIABETES MELLITUS: ICD-10-CM

## 2022-04-25 PROBLEM — F43.22 ADJUSTMENT DISORDER WITH ANXIETY: Status: RESOLVED | Noted: 2021-06-10 | Resolved: 2022-04-25

## 2022-04-25 LAB
ANION GAP SERPL CALCULATED.3IONS-SCNC: 3 MMOL/L (ref 3–14)
BUN SERPL-MCNC: 11 MG/DL (ref 7–30)
CALCIUM SERPL-MCNC: 9 MG/DL (ref 8.5–10.1)
CHLORIDE BLD-SCNC: 109 MMOL/L (ref 94–109)
CHOLEST SERPL-MCNC: 206 MG/DL
CO2 SERPL-SCNC: 28 MMOL/L (ref 20–32)
CREAT SERPL-MCNC: 0.64 MG/DL (ref 0.66–1.25)
FASTING STATUS PATIENT QL REPORTED: NO
GFR SERPL CREATININE-BSD FRML MDRD: >90 ML/MIN/1.73M2
GLUCOSE BLD-MCNC: 96 MG/DL (ref 70–99)
HBA1C MFR BLD: 5.2 % (ref 0–5.6)
HDLC SERPL-MCNC: 45 MG/DL
LDLC SERPL CALC-MCNC: 124 MG/DL
NONHDLC SERPL-MCNC: 161 MG/DL
POTASSIUM BLD-SCNC: 4.5 MMOL/L (ref 3.4–5.3)
SODIUM SERPL-SCNC: 140 MMOL/L (ref 133–144)
TRIGL SERPL-MCNC: 185 MG/DL

## 2022-04-25 PROCEDURE — 87591 N.GONORRHOEAE DNA AMP PROB: CPT | Performed by: FAMILY MEDICINE

## 2022-04-25 PROCEDURE — 80061 LIPID PANEL: CPT | Performed by: FAMILY MEDICINE

## 2022-04-25 PROCEDURE — 87491 CHLMYD TRACH DNA AMP PROBE: CPT | Performed by: FAMILY MEDICINE

## 2022-04-25 PROCEDURE — 80048 BASIC METABOLIC PNL TOTAL CA: CPT | Performed by: FAMILY MEDICINE

## 2022-04-25 NOTE — PATIENT INSTRUCTIONS
After 3 months (so end of July), if you still have a lump on your neck, let me know and I'll order an ultrasound to see if this is a lipoma (fatty tumor) or a big lymph node.       I recommend you purchase a home blood pressure machine from the list of validated machines at validatebp.org.  The cheapest model on the list is probably the Omron 5 Series, which costs about $50.    I recommend you check your blood pressure every day for 7 days. Try to check 2-3 times in a row, a minute apart, and write down the numbers. Ideally, each day try to check at a different time of the day, since your blood pressure will change over the course of the day.     Please send me those numbers in [employer] after a week.     How to check your blood pressure at home:  - Make sure you use the correct size blood pressure cuff. Usually there will be symptoms on the cuff that will show you if it is too big or too small when you put it on your arm  - Put the cuff on your bare arm. Don't have clothes between the cuff and your arm  - Do not talk right before or during the check  - Have you legs uncrossed and feet flat on the floor  - Rest in the chair you are going to check your pressure in for at least 5 minutes before checking your pressure  - Have you arm supported while the machine is checking your pressure  - Make sure your bladder is empty before checking  - Don't exercise or have caffeine within 30 minutes of checking your pressure  - Check your pressure in both arms and see which one has a higher pressure. From then on, continue to check your pressure in the arm you know has the higher pressure.                https://ods.od.nih.gov/factsheets/Iron-HealthProfessional/

## 2022-04-25 NOTE — NURSING NOTE
"37 year old  Chief Complaint   Patient presents with     Physical     Mass     On neck x couple of months       Blood pressure (!) 149/96, pulse 95, temperature 98.4  F (36.9  C), temperature source Skin, resp. rate 13, height 1.734 m (5' 8.25\"), weight 111.7 kg (246 lb 4 oz), SpO2 98 %. Body mass index is 37.17 kg/m .  Patient Active Problem List   Diagnosis     Adjustment disorder with anxiety       Wt Readings from Last 2 Encounters:   04/25/22 111.7 kg (246 lb 4 oz)   04/09/21 92.5 kg (204 lb)     BP Readings from Last 3 Encounters:   04/25/22 (!) 149/96   04/09/21 129/84         Current Outpatient Medications   Medication     magnesium 250 MG tablet     multivitamin w/minerals (THERA-VIT-M) tablet     No current facility-administered medications for this visit.       Social History     Tobacco Use     Smoking status: Never Smoker     Smokeless tobacco: Never Used   Vaping Use     Vaping Use: Never used   Substance Use Topics     Alcohol use: Yes     Alcohol/week: 4.0 standard drinks     Types: 4 Standard drinks or equivalent per week     Drug use: Yes     Types: Other     Comment: Edibles       Health Maintenance Due   Topic Date Due     ADVANCE CARE PLANNING  Never done     INFLUENZA VACCINE (1) 09/01/2021     PREVENTIVE CARE VISIT  04/09/2022       No results found for: PAP      April 25, 2022 9:12 AM    "

## 2022-04-26 LAB
C TRACH DNA SPEC QL NAA+PROBE: NEGATIVE
N GONORRHOEA DNA SPEC QL NAA+PROBE: NEGATIVE

## 2022-05-10 ENCOUNTER — MYC MEDICAL ADVICE (OUTPATIENT)
Dept: FAMILY MEDICINE | Facility: CLINIC | Age: 37
End: 2022-05-10
Payer: COMMERCIAL

## 2022-05-10 DIAGNOSIS — I10 ESSENTIAL HYPERTENSION: ICD-10-CM

## 2022-05-16 PROBLEM — I10 ESSENTIAL HYPERTENSION: Status: ACTIVE | Noted: 2022-05-16

## 2022-05-16 NOTE — CONFIDENTIAL NOTE
Starting on hydrochlorothiazide for new diagnosis hypertension    Henrik Babb MD on 5/25/2022 at 4:26 PM

## 2022-05-25 RX ORDER — HYDROCHLOROTHIAZIDE 25 MG/1
25 TABLET ORAL DAILY
Qty: 90 TABLET | Refills: 0 | Status: SHIPPED | OUTPATIENT
Start: 2022-05-25 | End: 2022-08-24

## 2022-06-13 NOTE — PROGRESS NOTES
"[facility]DIR[facility]  Mary 15, 2022    *[facility] initiated appt via video as scheduled, but due to poor connectivity (delayed audio/video), pt requested to change appt to telephone.     Telemedicine Visit: The patient's condition can be safely assessed and treated via synchronous audio and visual telemedicine encounter.      Reason for Telemedicine Visit: Patient has requested telehealth visit    Originating Site (Patient Location): Patient's place of employment    Distant Site (Provider Location): [facility]    Consent:  The patient/guardian has verbally consented to: the potential risks and benefits of telemedicine (video visit) versus in person care; bill my insurance or make self-payment for services provided; and responsibility for payment of non-covered services.     Mode of Communication:  Video Conference via [employer]    As the provider I attest to compliance with applicable laws and regulations related to telemedicine.    Yuniel Ellis is a 37 year old male who is being evaluated via a telephone visit.      The patient has been notified of the following:     \"We have found that certain health care needs can be provided without the need for a face to face visit.  This service lets us provide the care you need with a short phone conversation.      I will have full access to your [facility] medical record during this entire phone call.   I will be taking notes for your medical record.     Since this is like an office visit, we will bill your insurance company for this service.  Please check with your medical insurance if this type of telephone visit/virtual care is covered.  You may be responsible for the cost of this service if insurance coverage is denied.      There are potential benefits and risks of telephone visits (e.g. limits to patient confidentiality) that differ from in-person visits.?  Confidentiality still applies for telephone services, and nobody will record the visit. " " It is important to be in a quiet, private space that is free of distractions (including cell phone or other devices) during the visit.??     If during the course of the call I believe a telephone visit is not appropriate, you will not be charged for this service\"    Consent has been obtained for this service by care team member: yes.    Start Time: 11:02 am  End Time: 11:34 am     Integrated Behavioral Health Services   Diagnostic Assessment Update      PATIENT'S NAME: Yuniel Ellis  MRN:   [id number]  :   1985  DATE OF SERVICE: 2022  SERVICE LOCATION: telehealth  Visit Activities: [facility] Only    Identifying Information:  Patient is a 37 year old year old, ,  male.  Patient attended the session alone.      Updates on Presenting Concern(s):  Patient reports the following reason(s) for continuing to receive behavioral services: separation/divorce from wife and how it impacts daughter.  Patient reported that his symptoms and concerns are intermittant.  Patient attributed the status of his current symptoms to changes in their life stressors.      Yuniel reported he and his wife are getting to the end of the divorce process, she will be moving to [place] at the end of the month, and Yuniel and his daughter will remain in [place].  Yuniel reported overall he's doing well, has a stable job, stable income, and is providing a stable home for his daughter, but at times he struggles with feelings of guilt regarding how the divorce has/will impact his daughter.   Feelings of Guilt about her recovery    Patient stated that his symptoms have resulted in the following functional impairments: childcare / parenting    Patient reports that other professional(s) are not involved in providing support / services.      Standard Screening tools completed, including PHQ9 and GAD7.  See Epic for today's results.  Historical PHQ9:  PHQ-9 SCORE 2021 2021 6/15/2022   PHQ-9 Total Score MyChart 1 " (Minimal depression) 0 0   PHQ-9 Total Score 1 0 0     Historical GAD7:  LISANDRA-7 SCORE 9/14/2021 11/16/2021 6/15/2022   Total Score 0 (minimal anxiety) 0 (minimal anxiety) 1 (minimal anxiety)   Total Score 0 0 1       Review of Updates to Patient's Life Situation:  Patient reported experiencing relationship changes, which included Yuniel and wife are reaching the end of divorce process.  Patient identified no changes in the stability of their social connections and identified supports. Patient noted that their living situation changed within the last year, as a result of separation/divorce.  Patient lives with daughter; Yuniel moved out home with wife last year, has obtained his own place and daughter resides primarily with him..     Patient's employment status did change.  Patient is currently employed full time and reports @[employer]@ is able to function appropriately at work.; Yuniel obtained full time employment    Patient reported no changes or new involvment with the legal system.  There are no ethnic, cultural or Taoism factors that may be relevant for therapy.       Mental Health History:  Patient is not currently receiving any mental health services.      Chemical Health History:   Patient is not currently receiving any chemical dependency treatment. Patient reports no problems as a result of their drinking / drug use.    Cage-AID score is: 0   Based on Cage-Aid score and clinical interview there  are not indications of drug or alcohol abuse.      Discussed the general effects of drugs and alcohol on health and well-being.      Significant Losses / Trauma / Abuse / Neglect Issues:  There are no no indications or report of: significant losses, trauma, abuse or neglect.    Issues of possible neglect are not present.      Medical History:   Patient Active Problem List   Diagnosis     Vegetarian diet     Essential hypertension       Medication Review:  Current Outpatient Medications   Medication      hydrochlorothiazide (HYDRODIURIL) 25 MG tablet     magnesium 250 MG tablet     multivitamin w/minerals (THERA-VIT-M) tablet     No current facility-administered medications for this visit.       Medication Compliance:  NA    Patient was provided recommendation to follow-up with physician.      Mental Status Assessment:  Appearance:   Appropriate   Eye Contact:   Good   Psychomotor Behavior: Normal   Attitude:   Cooperative   Orientation:   All  Speech   Rate / Production: Normal/ Responsive Normal    Volume:  Normal   Mood:    Anxious   Affect:    Appropriate  Worrisome   Thought Content:  Clear   Thought Form:  Coherent  Logical   Insight:    Good       Safety Assessment:    Patient denies a history of suicidal ideation, suicide attempts, self-injurious behavior, homicidal ideation, homicidal behavior and and other safety concerns  Patient denies current or recent suicidal ideation or behaviors.  Patient denies current or recent homicidal ideation or behaviors.  Patient denies current or recent self injurious behavior or ideation.  Patient denies other safety concerns.  Patient reports there are no firearms in the house  Protective Factors Children in the home , Sense of responsibility to family, Life Satisfaction, Positive coping skills and Positive problem-solving skills   Risk Factors: denied      Plan for Safety and Risk Management:  A safety and risk management plan has not been developed at this time, however patient was encouraged to call [place] [phone] should there be a change in any of these risk factors.      Patient's Strengths and Limitations:  Patient identified the following strengths or resources that will help him succeed in counseling: commitment to health and well being, family support, insight, intelligence, motivation and work ethic. Patient identified the following supports: family. Things that may interfere with the patient's success in counseling include:few friends.    Diagnostic  Criteria:  Adjustment Disorder  A. The development of emotional or behavioral symptoms in response to an identifiable stressor(s) occurring within 3 months of the onset of the stressor(s)  B. These symptoms or behaviors are clinically significant, as evidenced by one or both of the following:       - Marked distress that is out of proportion to the severity/intensity of the stressor (with consideration for external context & culture)       - Significant impairment in social, occupational, or other important areas of functioning  C. The stress-related disturbance does not meet criteria for another disorder & is not not an exacerbation of another mental disorder  D. The symptoms do not represent normal bereavement  E. Once the stressor or its consequences have terminated, the symptoms do not persist for more than an additional 6 months       * Adjustment Disorder with Anxiety: The predominant manfestations are symptoms such as nervousness, worry, or jitteriness, or, in children separation anxiety from major attachment figures      Functional Status:  Patient's symptoms have caused reduced functional status in the following areas: Social / Relational - wife and parenting      DSM5 Diagnoses: (Sustained by DSM5 Criteria Listed Above)  Diagnoses: Adjustment Disorders  309.24 (F43.22) With anxiety  Psychosocial & Contextual Factors: In process of divorce, heterosexual male, primary parent of young daughter, employed full-time  Promis 10: pt completed on 6/15/22    Preliminary Treatment Plan:    Treatment will focus on: Adjustment Difficulties related to: family concerns.    The Following referrals were discussed and initiated: Outpatient Therapy    Collaboration with other professionals is not indicated at this time.    Referral to another professional/service is not indicated at this time.    A Release of Information is not needed at this time.    Report to child or adult protection services was NA.    Shawn G. Ullrich,  LICSW, Behavioral Health Clinician

## 2022-06-15 ENCOUNTER — VIRTUAL VISIT (OUTPATIENT)
Dept: BEHAVIORAL HEALTH | Facility: CLINIC | Age: 37
End: 2022-06-15
Payer: COMMERCIAL

## 2022-06-15 DIAGNOSIS — F43.22 ADJUSTMENT DISORDER WITH ANXIOUS MOOD: Primary | ICD-10-CM

## 2022-06-15 ASSESSMENT — ANXIETY QUESTIONNAIRES
GAD7 TOTAL SCORE: 1
3. WORRYING TOO MUCH ABOUT DIFFERENT THINGS: NOT AT ALL
5. BEING SO RESTLESS THAT IT IS HARD TO SIT STILL: NOT AT ALL
7. FEELING AFRAID AS IF SOMETHING AWFUL MIGHT HAPPEN: NOT AT ALL
6. BECOMING EASILY ANNOYED OR IRRITABLE: NOT AT ALL
8. IF YOU CHECKED OFF ANY PROBLEMS, HOW DIFFICULT HAVE THESE MADE IT FOR YOU TO DO YOUR WORK, TAKE CARE OF THINGS AT HOME, OR GET ALONG WITH OTHER PEOPLE?: NOT DIFFICULT AT ALL
7. FEELING AFRAID AS IF SOMETHING AWFUL MIGHT HAPPEN: NOT AT ALL
4. TROUBLE RELAXING: SEVERAL DAYS
1. FEELING NERVOUS, ANXIOUS, OR ON EDGE: NOT AT ALL
GAD7 TOTAL SCORE: 1
2. NOT BEING ABLE TO STOP OR CONTROL WORRYING: NOT AT ALL
GAD7 TOTAL SCORE: 1

## 2022-08-23 ENCOUNTER — MYC MEDICAL ADVICE (OUTPATIENT)
Dept: FAMILY MEDICINE | Facility: CLINIC | Age: 37
End: 2022-08-23

## 2022-08-23 DIAGNOSIS — I10 ESSENTIAL HYPERTENSION: ICD-10-CM

## 2022-08-24 RX ORDER — HYDROCHLOROTHIAZIDE 25 MG/1
25 TABLET ORAL DAILY
Qty: 90 TABLET | Refills: 1 | Status: SHIPPED | OUTPATIENT
Start: 2022-08-24 | End: 2023-03-08

## 2022-08-24 RX ORDER — LISINOPRIL 10 MG/1
10 TABLET ORAL DAILY
Qty: 90 TABLET | Refills: 1 | Status: SHIPPED | OUTPATIENT
Start: 2022-08-24 | End: 2023-03-08

## 2022-08-24 NOTE — CONFIDENTIAL NOTE
Patient with hypertension    BP Readings from Last 6 Encounters:   04/25/22 (!) 142/88   04/09/21 129/84     Started on hydrochlorothiazide 25mg daily in May    Reviewed home readings - systolic pressure improved but diastolic pressure remains high.     Will plan to start second agent    Henrik Babb MD on 8/24/2022 at 9:03 AM

## 2022-10-02 ENCOUNTER — HEALTH MAINTENANCE LETTER (OUTPATIENT)
Age: 37
End: 2022-10-02

## 2022-10-17 ENCOUNTER — MYC MEDICAL ADVICE (OUTPATIENT)
Dept: FAMILY MEDICINE | Facility: CLINIC | Age: 37
End: 2022-10-17

## 2022-10-18 ENCOUNTER — LAB (OUTPATIENT)
Dept: LAB | Facility: CLINIC | Age: 37
End: 2022-10-18
Payer: COMMERCIAL

## 2022-10-18 VITALS — DIASTOLIC BLOOD PRESSURE: 83 MMHG | SYSTOLIC BLOOD PRESSURE: 115 MMHG

## 2022-10-18 DIAGNOSIS — I10 ESSENTIAL HYPERTENSION: ICD-10-CM

## 2022-10-18 LAB
ANION GAP SERPL CALCULATED.3IONS-SCNC: 8 MMOL/L (ref 3–14)
BUN SERPL-MCNC: 10 MG/DL (ref 7–30)
CALCIUM SERPL-MCNC: 9.1 MG/DL (ref 8.5–10.1)
CHLORIDE BLD-SCNC: 102 MMOL/L (ref 94–109)
CO2 SERPL-SCNC: 27 MMOL/L (ref 20–32)
CREAT SERPL-MCNC: 0.71 MG/DL (ref 0.66–1.25)
GFR SERPL CREATININE-BSD FRML MDRD: >90 ML/MIN/1.73M2
GLUCOSE BLD-MCNC: 102 MG/DL (ref 70–99)
POTASSIUM BLD-SCNC: 3.8 MMOL/L (ref 3.4–5.3)
SODIUM SERPL-SCNC: 137 MMOL/L (ref 133–144)

## 2022-10-18 PROCEDURE — 80048 BASIC METABOLIC PNL TOTAL CA: CPT

## 2022-10-18 PROCEDURE — 36415 COLL VENOUS BLD VENIPUNCTURE: CPT

## 2023-03-07 ENCOUNTER — MYC MEDICAL ADVICE (OUTPATIENT)
Dept: FAMILY MEDICINE | Facility: CLINIC | Age: 38
End: 2023-03-07

## 2023-03-07 DIAGNOSIS — I10 ESSENTIAL HYPERTENSION: ICD-10-CM

## 2023-03-08 RX ORDER — HYDROCHLOROTHIAZIDE 25 MG/1
25 TABLET ORAL DAILY
Qty: 30 TABLET | Refills: 1 | Status: SHIPPED | OUTPATIENT
Start: 2023-03-08 | End: 2023-04-28

## 2023-03-08 RX ORDER — LISINOPRIL 10 MG/1
10 TABLET ORAL DAILY
Qty: 30 TABLET | Refills: 1 | Status: SHIPPED | OUTPATIENT
Start: 2023-03-08 | End: 2023-04-28

## 2023-03-08 NOTE — TELEPHONE ENCOUNTER
Lisinopril (Zestril) 10 mg + Hydrochlorothiazide (Hydrodiuril) 25 mg    Last Office Visit: 4/25/22  [facility] Appointments: 4/28/23  Medication last refilled:     8/24/22 #90 with 1 refill(s) - Hydrochlorothiazide   8/24/22 #90 with 1 refill(s) - Lisinopril    Vital Signs 4/9/2021 4/25/2022 10/18/2022   Systolic 129 142 115   Diastolic 84 88 83     Required labs per protocol:    LAB REF RANGE 4/25/22 10/18/22   Creatinine 0.66-1.25 mg/dL 0.64 Low 0.71   Potassium 3.4-5.3 mmol/L 4.5 3.8   Sodium 137.3-146.3 mmol/L 140 137     Prescription approved per [facility] Refill Protocol.    IMER WhiteN, RN, CCM

## 2023-04-21 ASSESSMENT — ENCOUNTER SYMPTOMS
NERVOUS/ANXIOUS: 0
FREQUENCY: 0
SHORTNESS OF BREATH: 0
COUGH: 0
PALPITATIONS: 0
HEMATOCHEZIA: 0
DYSURIA: 0
DIARRHEA: 0
SORE THROAT: 0
EYE PAIN: 0
JOINT SWELLING: 0
ABDOMINAL PAIN: 0
DIZZINESS: 0
ARTHRALGIAS: 0
CHILLS: 0
NAUSEA: 0
FEVER: 0
HEADACHES: 0
WEAKNESS: 0
HEARTBURN: 0
HEMATURIA: 0
MYALGIAS: 0
CONSTIPATION: 0
PARESTHESIAS: 0

## 2023-04-27 ASSESSMENT — ENCOUNTER SYMPTOMS
HEMATOCHEZIA: 0
MYALGIAS: 0
DIZZINESS: 0
SORE THROAT: 0
CONSTIPATION: 0
NERVOUS/ANXIOUS: 0
DIARRHEA: 0
CHILLS: 0
DYSURIA: 0
HEARTBURN: 0
JOINT SWELLING: 0
HEMATURIA: 0
SHORTNESS OF BREATH: 0
PARESTHESIAS: 0
PALPITATIONS: 0
ABDOMINAL PAIN: 0
FREQUENCY: 0
FEVER: 0
EYE PAIN: 0
ARTHRALGIAS: 0
COUGH: 0
HEADACHES: 0
WEAKNESS: 0
NAUSEA: 0

## 2023-04-27 NOTE — PROGRESS NOTES
SUBJECTIVE:   CC: Yuniel is an 38 year old who presents for preventative health visit.        View : No data to display.              Healthy Habits:     Getting at least 3 servings of Calcium per day:  Yes    Bi-annual eye exam:  Yes    Dental care twice a year:  NO    Sleep apnea or symptoms of sleep apnea:  None    Diet:  Vegetarian/vegan    Frequency of exercise:  6-7 days/week    Duration of exercise:  30-45 minutes    Taking medications regularly:  Yes    Medication side effects:  None    PHQ-2 Total Score: 0    Additional concerns today:  No    - vegetarian diet  - occasionally with do dairy, eggs  - using lunch breaks for exercise now that he is working from home    Today's PHQ-2 Score:       4/21/2023     9:22 AM   PHQ-2 ( 1999 [employer])   Q1: Little interest or pleasure in doing things 0   Q2: Feeling down, depressed or hopeless 0   PHQ-2 Score 0   Q1: Little interest or pleasure in doing things Not at all   Q2: Feeling down, depressed or hopeless Not at all   PHQ-2 Score 0       Have you ever done Advance Care Planning? (For example, a Health Directive, POLST, or a discussion with a medical provider or your loved ones about your wishes): No, advance care planning information given to patient to review.  Advanced care planning was discussed at today's visit.     # Hypertension   BP Readings from Last 5 Encounters:   04/28/23 122/84   10/18/22 115/83   04/25/22 (!) 142/88   04/09/21 129/84     Creatinine   Date Value Ref Range Status   10/18/2022 0.71 0.66 - 1.25 mg/dL Final   04/25/2022 0.64 (L) 0.66 - 1.25 mg/dL Final   04/24/2021 0.83 0.66 - 1.25 mg/dL Final     Sodium   Date Value Ref Range Status   10/18/2022 137 133 - 144 mmol/L Final   04/24/2021 139 133 - 144 mmol/L Final     Potassium   Date Value Ref Range Status   10/18/2022 3.8 3.4 - 5.3 mmol/L Final   04/24/2021 4.1 3.4 - 5.3 mmol/L Final     - Current Regimen: hydrochlorothiazide 25mg daily, lisinopril 10mg daily   - has Omron machine at home,  hasn't checked in 6 months  - no dizziness    # Neck Mass - lipoma vs LN  - noted in early 2022  - posterior neck  - unchanged  - not painful      Social History     Tobacco Use     Smoking status: Never     Smokeless tobacco: Never   Vaping Use     Vaping status: Never Used   Substance Use Topics     Alcohol use: Yes     Alcohol/week: 4.0 standard drinks of alcohol     Types: 4 Standard drinks or equivalent per week     Comment: 3-4 drinks a week, spread out           4/21/2023     9:22 AM   Alcohol Use   Prescreen: >3 drinks/day or >7 drinks/week? No     Last PSA: No results found for: PSA    Reviewed orders with patient. Reviewed health maintenance and updated orders accordingly - Yes    Reviewed and updated as needed this visit by clinical staff   Tobacco  Allergies  Meds   Med Hx  Surg Hx  Fam Hx  Soc Hx        Reviewed and updated as needed this visit by Provider   Tobacco  Allergies  Meds   Med Hx  Surg Hx  Fam Hx  Soc Hx         Review of Systems   Constitutional: Negative for chills and fever.   HENT: Negative for congestion, ear pain, hearing loss and sore throat.    Eyes: Negative for pain and visual disturbance.   Respiratory: Negative for cough and shortness of breath.    Cardiovascular: Negative for chest pain, palpitations and peripheral edema.   Gastrointestinal: Negative for abdominal pain, constipation, diarrhea, heartburn, hematochezia and nausea.   Genitourinary: Negative for dysuria, frequency, genital sores, hematuria, impotence, penile discharge and urgency.   Musculoskeletal: Negative for arthralgias, joint swelling and myalgias.   Skin: Negative for rash.   Neurological: Negative for dizziness, weakness, headaches and paresthesias.   Psychiatric/Behavioral: Negative for mood changes. The patient is not nervous/anxious.        OBJECTIVE:   /84 (BP Location: Right arm, Patient Position: Sitting, Cuff Size: Adult Large)   Pulse 83   Temp 97.3  F (36.3  C) (Temporal)   Resp  "18   Ht 1.753 m (5' 9\")   Wt 105.7 kg (233 lb)   SpO2 97%   BMI 34.41 kg/m      Physical Exam  GENERAL: healthy, alert and no distress  EYES: Eyes grossly normal to inspection, PERRL and conjunctivae and sclerae normal  HENT: ear canals and TM's normal, nose and mouth without ulcers or lesions  NECK: no adenopathy, no asymmetry, masses, or scars and thyroid normal to palpation  RESP: lungs clear to auscultation - no rales, rhonchi or wheezes  CV: regular rate and rhythm, normal S1 S2, no S3 or S4, no murmur, click or rub, no peripheral edema and peripheral pulses strong  ABDOMEN: soft, nontender, no hepatosplenomegaly, no masses and bowel sounds normal  MS: no gross musculoskeletal defects noted, no edema  SKIN: 3cm, firm, rubbery, mobile nodule posterior neck to left of midline.  NEURO: Normal strength and tone, mentation intact and speech normal  PSYCH: mentation appears normal, affect normal/bright    Diagnostic Test Results:  Labs reviewed in Epic    ASSESSMENT/PLAN:     (Z00.00) Routine general medical examination at a health care facility  (primary encounter diagnosis)  Comment: Age appropriate screening and preventive services provided.   Plan:     (Z13.220) Screening for lipid disorders  Comment: Plan: Lipid panel reflex to direct LDL Fasting          (Z13.1) Screening for diabetes mellitus  Comment: Plan: Hemoglobin A1c          (I10) Essential hypertension  Comment: Chronic, stable.   Plan: Basic Metabolic Panel, hydrochlorothiazide         (HYDRODIURIL) 25 MG tablet, lisinopril         (ZESTRIL) 10 MG tablet          (Z11.4) Screening for HIV (human immunodeficiency virus)  Comment: Plan: HIV Antigen Antibody Combo          (Z11.3) Routine screening for STI (sexually transmitted infection)  Comment: Plan: NEISSERIA GONORRHOEA PCR, CHLAMYDIA TRACHOMATIS        PCR          (Z23) Need for vaccination against hepatitis A  Comment: Plan: HEPA VACCINE ADULT IM          (Z23) Need for hepatitis B " vaccination  Comment: Plan: HEPATITIS B VACCINE,ADULT,IM            COUNSELING:   Reviewed preventive health counseling, as reflected in patient instructions        He reports that he has never smoked. He has never used smokeless tobacco.        Henrik Babb MD  [facility]

## 2023-04-28 ENCOUNTER — OFFICE VISIT (OUTPATIENT)
Dept: FAMILY MEDICINE | Facility: CLINIC | Age: 38
End: 2023-04-28
Payer: COMMERCIAL

## 2023-04-28 VITALS
WEIGHT: 233 LBS | DIASTOLIC BLOOD PRESSURE: 84 MMHG | OXYGEN SATURATION: 97 % | SYSTOLIC BLOOD PRESSURE: 122 MMHG | BODY MASS INDEX: 34.51 KG/M2 | RESPIRATION RATE: 18 BRPM | TEMPERATURE: 97.3 F | HEIGHT: 69 IN | HEART RATE: 83 BPM

## 2023-04-28 DIAGNOSIS — Z11.4 SCREENING FOR HIV (HUMAN IMMUNODEFICIENCY VIRUS): ICD-10-CM

## 2023-04-28 DIAGNOSIS — I10 ESSENTIAL HYPERTENSION: ICD-10-CM

## 2023-04-28 DIAGNOSIS — Z23 NEED FOR VACCINATION AGAINST HEPATITIS A: ICD-10-CM

## 2023-04-28 DIAGNOSIS — Z23 NEED FOR HEPATITIS B VACCINATION: ICD-10-CM

## 2023-04-28 DIAGNOSIS — Z13.1 SCREENING FOR DIABETES MELLITUS: ICD-10-CM

## 2023-04-28 DIAGNOSIS — Z00.00 ROUTINE GENERAL MEDICAL EXAMINATION AT A HEALTH CARE FACILITY: Primary | ICD-10-CM

## 2023-04-28 DIAGNOSIS — Z13.220 SCREENING FOR LIPID DISORDERS: ICD-10-CM

## 2023-04-28 DIAGNOSIS — Z11.3 ROUTINE SCREENING FOR STI (SEXUALLY TRANSMITTED INFECTION): ICD-10-CM

## 2023-04-28 LAB — HBA1C MFR BLD: 5 % (ref 0–5.6)

## 2023-04-28 PROCEDURE — 80061 LIPID PANEL: CPT | Performed by: FAMILY MEDICINE

## 2023-04-28 PROCEDURE — 80048 BASIC METABOLIC PNL TOTAL CA: CPT | Performed by: FAMILY MEDICINE

## 2023-04-28 PROCEDURE — 87491 CHLMYD TRACH DNA AMP PROBE: CPT | Performed by: FAMILY MEDICINE

## 2023-04-28 PROCEDURE — 87591 N.GONORRHOEAE DNA AMP PROB: CPT | Mod: ORL | Performed by: FAMILY MEDICINE

## 2023-04-28 PROCEDURE — 87591 N.GONORRHOEAE DNA AMP PROB: CPT | Performed by: FAMILY MEDICINE

## 2023-04-28 PROCEDURE — 87389 HIV-1 AG W/HIV-1&-2 AB AG IA: CPT | Performed by: FAMILY MEDICINE

## 2023-04-28 RX ORDER — LISINOPRIL 10 MG/1
10 TABLET ORAL DAILY
Qty: 90 TABLET | Refills: 3 | Status: SHIPPED | OUTPATIENT
Start: 2023-04-28 | End: 2024-03-06

## 2023-04-28 RX ORDER — HYDROCHLOROTHIAZIDE 25 MG/1
25 TABLET ORAL DAILY
Qty: 90 TABLET | Refills: 3 | Status: SHIPPED | OUTPATIENT
Start: 2023-04-28 | End: 2024-03-06

## 2023-04-28 RX ORDER — FERROUS SULFATE 325(65) MG
325 TABLET ORAL
COMMUNITY

## 2023-04-28 NOTE — PATIENT INSTRUCTIONS
1) Go see a dentist    2) every 6-12 months, check your blood pressure for just 2-3 days. If you are <135/85, great, put it away      Preventive Health Recommendations  Male Ages 26 - 39    Yearly exam:             See your health care provider every year in order to  o   Review health changes.   o   Discuss preventive care.    o   Review your medicines if your doctor has prescribed any.  You should be tested each year for STDs (sexually transmitted diseases), if you re at risk.   After age 35, talk to your provider about cholesterol testing. If you are at risk for heart disease, have your cholesterol tested at least every 5 years.   If you are at risk for diabetes, you should have a diabetes test (fasting glucose).  Shots: Get a flu shot each year. Get a tetanus shot every 10 years.     Nutrition:  Eat at least 5 servings of fruits and vegetables daily.   Eat whole-grain bread, whole-wheat pasta and brown rice instead of white grains and rice.   Get adequate Calcium and Vitamin D.     Lifestyle  Exercise for at least 150 minutes a week (30 minutes a day, 5 days a week). This will help you control your weight and prevent disease.   Limit alcohol to one drink per day.   No smoking.   Wear sunscreen to prevent skin cancer.   See your dentist every six months for an exam and cleaning.

## 2023-04-28 NOTE — NURSING NOTE
"38 year old  Chief Complaint   Patient presents with     Physical     Has been doing mostly plant based diet -- wondering if any deficiencies -- b12 or iron.       Blood pressure 122/84, pulse 83, temperature 97.3  F (36.3  C), temperature source Temporal, resp. rate 18, height 1.753 m (5' 9\"), weight 105.7 kg (233 lb), SpO2 97 %. Body mass index is 34.41 kg/m .  Patient Active Problem List   Diagnosis     Vegetarian diet     Essential hypertension       Wt Readings from Last 2 Encounters:   04/28/23 105.7 kg (233 lb)   04/25/22 111.7 kg (246 lb 4 oz)     BP Readings from Last 3 Encounters:   04/28/23 122/84   10/18/22 115/83   04/25/22 (!) 142/88         Current Outpatient Medications   Medication     Cyanocobalamin (B-12) 1000 MCG TBCR     hydrochlorothiazide (HYDRODIURIL) 25 MG tablet     lisinopril (ZESTRIL) 10 MG tablet     magnesium 250 MG tablet     multivitamin w/minerals (THERA-VIT-M) tablet     No current facility-administered medications for this visit.       Social History     Tobacco Use     Smoking status: Never     Smokeless tobacco: Never   Vaping Use     Vaping status: Never Used   Substance Use Topics     Alcohol use: Yes     Alcohol/week: 4.0 standard drinks of alcohol     Types: 4 Standard drinks or equivalent per week     Comment: 3-4 drinks a week, spread out     Drug use: Yes     Types: Marijuana     Comment: Edibles, a couple of times a month       Health Maintenance Due   Topic Date Due     ADVANCE CARE PLANNING  Never done     HEPATITIS B IMMUNIZATION (1 of 3 - 3-dose series) Never done     INFLUENZA VACCINE (1) 09/01/2022     YEARLY PREVENTIVE VISIT  04/25/2023       No results found for: PAP      April 28, 2023 3:37 PM    "

## 2023-04-28 NOTE — NURSING NOTE
Screening Questionnaire for Adult Immunization    Are you sick today?   No   Do you have allergies to medications, food, a vaccine component or latex?   No   Have you ever had a serious reaction after receiving a vaccination?   No   Do you have a long-term health problem with heart disease, lung disease, asthma, kidney disease, metabolic disease (e.g. diabetes), anemia, or other blood disorder?   No   Do you have cancer, leukemia, HIV/AIDS, or any other immune system problem?   No   In the past 3 months, have you taken medications that affect  your immune system, such as prednisone, other steroids, or anticancer drugs; drugs for the treatment of rheumatoid arthritis, Crohn s disease, or psoriasis; or have you had radiation treatments?   No   Have you had a seizure, or a brain or other nervous system problem?   No   During the past year, have you received a transfusion of blood or blood     products, or been given immune (gamma) globulin or antiviral drug?   No   For women: Are you pregnant or is there a chance you could become        pregnant during the next month?   No   Have you received any vaccinations in the past 4 weeks?   No     Immunization questionnaire answers were all negative.        Per orders of Dr. Babb. Patient instructed to remain in clinic for 15 minutes afterwards, and to report any adverse reaction to me immediately.       Screening performed by Judy Lira LPN on 4/28/2023 at 4:30 PM.

## 2023-04-29 LAB
ANION GAP SERPL CALCULATED.3IONS-SCNC: 14 MMOL/L (ref 7–15)
BUN SERPL-MCNC: 8.2 MG/DL (ref 6–20)
C TRACH DNA SPEC QL NAA+PROBE: NEGATIVE
CALCIUM SERPL-MCNC: 9.9 MG/DL (ref 8.6–10)
CHLORIDE SERPL-SCNC: 100 MMOL/L (ref 98–107)
CHOLEST SERPL-MCNC: 213 MG/DL
CREAT SERPL-MCNC: 0.79 MG/DL (ref 0.67–1.17)
DEPRECATED HCO3 PLAS-SCNC: 24 MMOL/L (ref 22–29)
GFR SERPL CREATININE-BSD FRML MDRD: >90 ML/MIN/1.73M2
GLUCOSE SERPL-MCNC: 89 MG/DL (ref 70–99)
HDLC SERPL-MCNC: 52 MG/DL
LDLC SERPL CALC-MCNC: 133 MG/DL
N GONORRHOEA DNA SPEC QL NAA+PROBE: NEGATIVE
NONHDLC SERPL-MCNC: 161 MG/DL
POTASSIUM SERPL-SCNC: 4.3 MMOL/L (ref 3.4–5.3)
SODIUM SERPL-SCNC: 138 MMOL/L (ref 136–145)
TRIGL SERPL-MCNC: 138 MG/DL

## 2023-04-30 PROBLEM — I10 ESSENTIAL HYPERTENSION: Chronic | Status: ACTIVE | Noted: 2022-05-16

## 2023-05-01 LAB — HIV 1+2 AB+HIV1 P24 AG SERPL QL IA: NONREACTIVE

## 2023-05-08 ENCOUNTER — NURSE TRIAGE (OUTPATIENT)
Dept: FAMILY MEDICINE | Facility: CLINIC | Age: 38
End: 2023-05-08

## 2023-05-08 NOTE — TELEPHONE ENCOUNTER
Reason for Disposition    MODERATE pain (e.g., interferes with normal activities) and present > 3 days    Answer Assessment - Initial Assessment Questions  1. ONSET: 1 week  2. LOCATION: Anus  3. SEVERITY: Moderate - aches constantly and with certain movements is an intense pain.  4. CAUSE: He wonders if it was due to a long run or sitting on the toilet too long.  5. FEVER: No  6. OTHER SYMPTOMS: None.  No bleeding from the rectum and stools are soft and normal with no straining or pushing.  7. PREGNANCY: N/A  8. TRAVEL: No    Recommended sitz baths and using ice for 20 minutes on and 40 minutes off.  Scheduled to see Dr. Arellano on 5/11/23.  Yuniel will call back before his appointment if he develops any bleeding or pain worsens.    MARK ANTHONY White, RN, [facility]  05/08/23  10:35 AM  Phone: [phone]    Protocols used: MUSCLE ACHES AND BODY PAIN-A-[place]

## 2023-05-08 NOTE — TELEPHONE ENCOUNTER
Who is calling? Patient  Reason for Call:Requesting call back regarding what to do about his anal discomfort before appointment on 5/11

## 2023-05-10 NOTE — PROGRESS NOTES
"  Assessment & Plan   Problem List Items Addressed This Visit    None  Visit Diagnoses     Hemorrhoids, unspecified hemorrhoid type    -  Primary    Relevant Medications    hydrocortisone (ANUSOL-HC) 25 MG suppository         No evidence of external hemorrhoid, fissure or fistula. Patient denies any history of trauma to the area or concerns of rectal gonorrhea/chlamydia. Based on reported history I continue to suspect likely internal hemorrhoids. Steroid suppositories as ordered above. Encouraged Yuniel to contact me if symptoms fail to improve with this treatment as I would refer to colorectal for further consideration.    Karel Arellano MD  [facility]    Subjective   Yuniel is a 38 year old, presenting for the following health issues:  Consult (Ongoing since last Thursday, pain and discomfort in and around the rectum area. )    HPI   \"anal discomfort\"  - triage note of 5/8/23  Reason for Disposition    MODERATE pain (e.g., interferes with normal activities) and present > 3 days  Answer Assessment - Initial Assessment Questions  1. ONSET: 1 week  2. LOCATION: Anus  3. SEVERITY: Moderate - aches constantly and with certain movements is an intense pain.  4. CAUSE: He wonders if it was due to a long run or sitting on the toilet too long.  5. FEVER: No  6. OTHER SYMPTOMS: None.  No bleeding from the rectum and stools are soft and normal with no straining or pushing.  7. PREGNANCY: N/A  8. TRAVEL: No  Recommended sitz baths and using ice for 20 minutes on and 40 minutes off.  Scheduled to see Dr. Arellano on 5/11/23.  Yuniel will call back before his appointment if he develops any bleeding or pain worsens.  Today  - persistent discomfort with any sort of bearing down, squats at the gym, long hours sitting at work  - denies any blood with BMs  - reports typically 2-3 BMs per day, no excessive pressure with this       Review of Systems   Constitutional, HEENT, cardiovascular, pulmonary, gi and gu systems are " "negative, except as otherwise noted.      Objective    /80 (BP Location: Right arm, Patient Position: Sitting, Cuff Size: Adult Large)   Pulse 80   Temp 98  F (36.7  C) (Skin)   Resp 15   Ht 1.753 m (5' 9\")   Wt 105.7 kg (233 lb)   SpO2 99%   BMI 34.41 kg/m    Body mass index is 34.41 kg/m .  Physical Exam    GENERAL: healthy, alert and no distress  NECK: no adenopathy, no asymmetry, masses, or scars and thyroid normal to palpation  RESP: lungs clear to auscultation - no rales, rhonchi or wheezes  CV: regular rate and rhythm, normal S1 S2, no S3 or S4, no murmur, click or rub, no peripheral edema and peripheral pulses strong  ABDOMEN: soft, nontender, no hepatosplenomegaly, no masses and bowel sounds normal  RECTAL (male): normal sphincter tone, no rectal masses, prostate normal size, smooth, nontender without nodules or masses  MS: no gross musculoskeletal defects noted, no edema              "

## 2023-05-11 ENCOUNTER — OFFICE VISIT (OUTPATIENT)
Dept: FAMILY MEDICINE | Facility: CLINIC | Age: 38
End: 2023-05-11
Payer: COMMERCIAL

## 2023-05-11 VITALS
HEART RATE: 80 BPM | HEIGHT: 69 IN | OXYGEN SATURATION: 99 % | WEIGHT: 233 LBS | DIASTOLIC BLOOD PRESSURE: 80 MMHG | SYSTOLIC BLOOD PRESSURE: 117 MMHG | TEMPERATURE: 98 F | RESPIRATION RATE: 15 BRPM | BODY MASS INDEX: 34.51 KG/M2

## 2023-05-11 DIAGNOSIS — K64.9 HEMORRHOIDS, UNSPECIFIED HEMORRHOID TYPE: Primary | ICD-10-CM

## 2023-05-11 RX ORDER — HYDROCORTISONE ACETATE 25 MG/1
25 SUPPOSITORY RECTAL 2 TIMES DAILY
Qty: 14 SUPPOSITORY | Refills: 1 | Status: SHIPPED | OUTPATIENT
Start: 2023-05-11 | End: 2024-04-29

## 2023-05-11 NOTE — NURSING NOTE
"38 year old  Chief Complaint   Patient presents with     Consult     Ongoing since last Thursday, pain and discomfort in and around the rectum area.        Blood pressure 117/80, pulse 80, temperature 98  F (36.7  C), temperature source Skin, resp. rate 15, height 1.753 m (5' 9\"), weight 105.7 kg (233 lb), SpO2 99 %. Body mass index is 34.41 kg/m .  Patient Active Problem List   Diagnosis     Vegetarian diet     Essential hypertension       Wt Readings from Last 2 Encounters:   05/11/23 105.7 kg (233 lb)   04/28/23 105.7 kg (233 lb)     BP Readings from Last 3 Encounters:   05/11/23 117/80   04/28/23 122/84   10/18/22 115/83         Current Outpatient Medications   Medication     Cyanocobalamin (B-12) 1000 MCG TBCR     ferrous sulfate (FEROSUL) 325 (65 Fe) MG tablet     hydrochlorothiazide (HYDRODIURIL) 25 MG tablet     lisinopril (ZESTRIL) 10 MG tablet     magnesium 250 MG tablet     multivitamin w/minerals (THERA-VIT-M) tablet     No current facility-administered medications for this visit.       Social History     Tobacco Use     Smoking status: Never     Smokeless tobacco: Never   Vaping Use     Vaping status: Never Used   Substance Use Topics     Alcohol use: Yes     Alcohol/week: 4.0 standard drinks of alcohol     Types: 4 Standard drinks or equivalent per week     Comment: 3-4 drinks a week, spread out     Drug use: Not Currently     Types: Marijuana     Comment: Edibles, a couple of times a month       Health Maintenance Due   Topic Date Due     INFLUENZA VACCINE (1) 09/01/2022       No results found for: PAP      May 11, 2023 11:35 AM    "

## 2024-03-05 DIAGNOSIS — I10 ESSENTIAL HYPERTENSION: ICD-10-CM

## 2024-03-06 RX ORDER — LISINOPRIL 10 MG/1
10 TABLET ORAL DAILY
Qty: 90 TABLET | Refills: 0 | Status: SHIPPED | OUTPATIENT
Start: 2024-03-06 | End: 2024-04-29

## 2024-03-06 RX ORDER — HYDROCHLOROTHIAZIDE 25 MG/1
25 TABLET ORAL DAILY
Qty: 90 TABLET | Refills: 0 | Status: SHIPPED | OUTPATIENT
Start: 2024-03-06 | End: 2024-04-29

## 2024-03-06 NOTE — TELEPHONE ENCOUNTER
Medication requested:   hydrochlorothiazide (HYDRODIURIL) 25 MG tablet   lisinopril (ZESTRIL) 10 MG tablet   Last office visit: 5/11/2023  [facility] appointments: 4/29/2024  Medication last refilled: 4/28/2023; 90 + 3 refills    BP Readings from Last 3 Encounters:   05/11/23 117/80   04/28/23 122/84   10/18/22 115/83     Component      Latest Ref Rng 4/28/2023  3:41 PM   Creatinine      0.67 - 1.17 mg/dL 0.79      Component      Latest Ref Rng 4/28/2023  3:41 PM   Potassium      3.4 - 5.3 mmol/L 4.3      Component      Latest Ref Rng 4/28/2023  3:41 PM   GFR Estimate      >60 mL/min/1.73m2 >90      Prescription approved per [facility] Refill Protocol.    MARK ANTHONY Champion, RN  03/06/24, 3:03 PM

## 2024-04-22 SDOH — HEALTH STABILITY: PHYSICAL HEALTH: ON AVERAGE, HOW MANY DAYS PER WEEK DO YOU ENGAGE IN MODERATE TO STRENUOUS EXERCISE (LIKE A BRISK WALK)?: 6 DAYS

## 2024-04-22 SDOH — HEALTH STABILITY: PHYSICAL HEALTH: ON AVERAGE, HOW MANY MINUTES DO YOU ENGAGE IN EXERCISE AT THIS LEVEL?: 30 MIN

## 2024-04-22 ASSESSMENT — SOCIAL DETERMINANTS OF HEALTH (SDOH): HOW OFTEN DO YOU GET TOGETHER WITH FRIENDS OR RELATIVES?: ONCE A WEEK

## 2024-04-29 ENCOUNTER — OFFICE VISIT (OUTPATIENT)
Dept: FAMILY MEDICINE | Facility: CLINIC | Age: 39
End: 2024-04-29
Payer: COMMERCIAL

## 2024-04-29 VITALS
OXYGEN SATURATION: 99 % | HEIGHT: 69 IN | HEART RATE: 67 BPM | WEIGHT: 235 LBS | BODY MASS INDEX: 34.8 KG/M2 | SYSTOLIC BLOOD PRESSURE: 117 MMHG | DIASTOLIC BLOOD PRESSURE: 81 MMHG | TEMPERATURE: 97 F

## 2024-04-29 DIAGNOSIS — Z13.220 LIPID SCREENING: Primary | ICD-10-CM

## 2024-04-29 DIAGNOSIS — M54.50 BILATERAL LOW BACK PAIN WITHOUT SCIATICA, UNSPECIFIED CHRONICITY: ICD-10-CM

## 2024-04-29 DIAGNOSIS — R29.898 HIP TIGHTNESS: ICD-10-CM

## 2024-04-29 DIAGNOSIS — I10 ESSENTIAL HYPERTENSION: ICD-10-CM

## 2024-04-29 PROCEDURE — 80048 BASIC METABOLIC PNL TOTAL CA: CPT | Mod: ORL | Performed by: FAMILY MEDICINE

## 2024-04-29 PROCEDURE — 82465 ASSAY BLD/SERUM CHOLESTEROL: CPT | Mod: ORL | Performed by: FAMILY MEDICINE

## 2024-04-29 RX ORDER — LISINOPRIL 10 MG/1
10 TABLET ORAL DAILY
Qty: 90 TABLET | Refills: 0 | Status: SHIPPED | OUTPATIENT
Start: 2024-04-29 | End: 2024-08-13

## 2024-04-29 RX ORDER — HYDROCHLOROTHIAZIDE 25 MG/1
25 TABLET ORAL DAILY
Qty: 90 TABLET | Refills: 0 | Status: SHIPPED | OUTPATIENT
Start: 2024-04-29 | End: 2024-08-13

## 2024-04-29 NOTE — NURSING NOTE
"39 year old  Chief Complaint   Patient presents with    Physical       Blood pressure 117/81, pulse 67, temperature 97  F (36.1  C), height 1.75 m (5' 8.9\"), weight 106.6 kg (235 lb), SpO2 99%. Body mass index is 34.81 kg/m .  Patient Active Problem List   Diagnosis    Vegetarian diet    Essential hypertension       Wt Readings from Last 2 Encounters:   04/29/24 106.6 kg (235 lb)   05/11/23 105.7 kg (233 lb)     BP Readings from Last 3 Encounters:   04/29/24 117/81   05/11/23 117/80   04/28/23 122/84         Current Outpatient Medications   Medication Sig Dispense Refill    Cyanocobalamin (B-12) 1000 MCG TBCR       ferrous sulfate (FEROSUL) 325 (65 Fe) MG tablet Take 325 mg by mouth daily (with breakfast)      hydrochlorothiazide (HYDRODIURIL) 25 MG tablet TAKE 1 TABLET BY MOUTH DAILY 90 tablet 0    lisinopril (ZESTRIL) 10 MG tablet TAKE 1 TABLET BY MOUTH DAILY 90 tablet 0    magnesium 250 MG tablet Take 1 tablet by mouth daily      multivitamin w/minerals (THERA-VIT-M) tablet Take 1 tablet by mouth daily      hydrocortisone (ANUSOL-HC) 25 MG suppository Place 1 suppository (25 mg) rectally 2 times daily (Patient not taking: Reported on 4/29/2024) 14 suppository 1     No current facility-administered medications for this visit.       Social History     Tobacco Use    Smoking status: Never    Smokeless tobacco: Never   Vaping Use    Vaping status: Never Used   Substance Use Topics    Alcohol use: Yes     Alcohol/week: 4.0 standard drinks of alcohol     Types: 4 Standard drinks or equivalent per week     Comment: 3-4 drinks a week, spread out    Drug use: Not Currently     Types: Marijuana     Comment: Edibles, a couple of times a month       Health Maintenance Due   Topic Date Due    HEPATITIS B IMMUNIZATION (2 of 3 - 19+ 3-dose series) 05/26/2023    COVID-19 Vaccine (4 - 2023-24 season) 09/01/2023    YEARLY PREVENTIVE VISIT  04/28/2024       No results found for: \"PAP\"      April 29, 2024 3:31 PM    "

## 2024-04-29 NOTE — PROGRESS NOTES
"Preventive Care Visit  [facility]  Karel Arellano MD, Family Medicine  Apr 29, 2024      Assessment & Plan   Problem List Items Addressed This Visit          Circulatory    Essential hypertension (Chronic)    Relevant Medications    hydrochlorothiazide (HYDRODIURIL) 25 MG tablet    lisinopril (ZESTRIL) 10 MG tablet    Other Relevant Orders    Basic metabolic panel     Other Visit Diagnoses       Lipid screening    -  Primary    Relevant Orders    Lipid Profile    Hip tightness        Relevant Orders    Physical Therapy  Referral    Bilateral low back pain without sciatica, unspecified chronicity        Relevant Orders    Physical Therapy  Referral           Referral to PT as noted for low back and hip tightness. Also discussed possible Yin Yoga therapy.     BP good today. Yuniel asked about possibly coming off HTN medications at some point. Reassured Yuniel of the safety and benefits of these medications but did leave open the possibility of stopping these medications if they appear to be no longer indicated due to lifestyle changes.     Patient has been advised of split billing requirements and indicates understanding: Yes         BMI  Estimated body mass index is 34.81 kg/m  as calculated from the following:    Height as of this encounter: 1.75 m (5' 8.9\").    Weight as of this encounter: 106.6 kg (235 lb).   Weight management plan: Discussed healthy diet and exercise guidelines    Counseling  Appropriate preventive services were discussed with this patient, including applicable screening as appropriate for fall prevention, nutrition, physical activity, Tobacco-use cessation, weight loss and cognition.  Checklist reviewing preventive services available has been given to the patient.  Reviewed patient's diet, addressing concerns and/or questions.     Karel Arellano MD  4:12 PM, April 29, 2024        Naresh Brice is a 39 year old, presenting for the following:  Physical      HPI  # " Health Maintenance  - BP:   BP Readings from Last 3 Encounters:   04/29/24 117/81   05/11/23 117/80   04/28/23 122/84   - Cholesterol: pending  Recent Labs   Lab Test 04/28/23  1541   CHOL 213*   HDL 52   *   TRIG 138   The 10-year ASCVD risk score (Humphrey GOLDEN, et al., 2019) is: 1.2%    Values used to calculate the score:      Age: 39 years      Sex: Male      Is Non- : No      Diabetic: No      Tobacco smoker: No      Systolic Blood Pressure: 117 mmHg      Is BP treated: Yes      HDL Cholesterol: 52 mg/dL      Total Cholesterol: 213 mg/dL  - Diabetes Screening: pending  - Lung Cancer Screening: not indicated  55-79yo w/30py smoking history and currently smoking OR quit within past 15 years:  Low dose CT annually and discontinued once a person has been 15 years tobacco free  - (+) seatbelt use, (+) helmet, (+) smoke detector  - Feels safe at home, denies verbal/physical/emotional abuse in past year: yes      Hip Tightness and low back pain  - works from home, mostly sedentary  - has felt increased low back and hip tightness  - no sciatica, no LE numbness or paresthesia  - wondering if he could meet with PT to discuss strategies for managing these symptoms        4/22/2024   General Health   How would you rate your overall physical health? Good   Feel stress (tense, anxious, or unable to sleep) Not at all         4/22/2024   Nutrition   Three or more servings of calcium each day? Yes   Diet: Vegetarian/vegan   How many servings of fruit and vegetables per day? 4 or more   How many sweetened beverages each day? 0-1         4/22/2024   Exercise   Days per week of moderate/strenous exercise 6 days   Average minutes spent exercising at this level 30 min         4/22/2024   Social Factors   Frequency of gathering with friends or relatives Once a week   Worry food won't last until get money to buy more No   Food not last or not have enough money for food? No   Do you have housing?  Yes   Are  you worried about losing your housing? No   Lack of transportation? No   Unable to get utilities (heat,electricity)? Yes   Want help with housing or utility concern? 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 Yes         4/22/2024   TB Screening   Were you born outside of the [place]? No         Today's PHQ-2 Score:       4/28/2024     4:47 PM   PHQ-2 ( 1999 [employer])   Q1: Little interest or pleasure in doing things 0   Q2: Feeling down, depressed or hopeless 0   PHQ-2 Score 0   Q1: Little interest or pleasure in doing things Not at all   Q2: Feeling down, depressed or hopeless Not at all   PHQ-2 Score 0           4/22/2024   Substance Use   Alcohol more than 3/day or more than 7/wk No   Do you use any other substances recreationally? (!) ALCOHOL    (!) CANNABIS PRODUCTS     Social History     Tobacco Use    Smoking status: Never    Smokeless tobacco: Never   Vaping Use    Vaping status: Never Used   Substance Use Topics    Alcohol use: Yes     Alcohol/week: 4.0 standard drinks of alcohol     Types: 4 Standard drinks or equivalent per week     Comment: 3-4 drinks a week, spread out    Drug use: Not Currently     Types: Marijuana     Comment: Edibles, a couple of times a month         4/22/2024   STI Screening   New sexual partner(s) since last STI/HIV test? No   ASCVD Risk   The 10-year ASCVD risk score (Humphrey GOLDEN, et al., 2019) is: 1.2%    Values used to calculate the score:      Age: 39 years      Sex: Male      Is Non- : No      Diabetic: No      Tobacco smoker: No      Systolic Blood Pressure: 117 mmHg      Is BP treated: Yes      HDL Cholesterol: 52 mg/dL      Total Cholesterol: 213 mg/dL        4/22/2024   Contraception/Family Planning   Questions about contraception or family planning No     Past Medical History:   Diagnosis Date    Essential hypertension      Past Surgical History:   Procedure Laterality Date    AS OPEN TX  "FRACTURE PHALANX/PHALANGES NOT GREAT TOE Right     Right 5th finger, may still have hardware in place         Review of Systems  Constitutional, HEENT, cardiovascular, pulmonary, gi and gu systems are negative, except as otherwise noted.     Objective    Exam  /81   Pulse 67   Temp 97  F (36.1  C)   Ht 1.75 m (5' 8.9\")   Wt 106.6 kg (235 lb)   SpO2 99%   BMI 34.81 kg/m     Estimated body mass index is 34.81 kg/m  as calculated from the following:    Height as of this encounter: 1.75 m (5' 8.9\").    Weight as of this encounter: 106.6 kg (235 lb).    Physical Exam  GENERAL: alert and no distress  NECK: no adenopathy, no asymmetry, masses, or scars  RESP: lungs clear to auscultation - no rales, rhonchi or wheezes  CV: regular rate and rhythm, normal S1 S2, no S3 or S4, no murmur, click or rub, no peripheral edema  ABDOMEN: soft, nontender, no hepatosplenomegaly, no masses and bowel sounds normal  MS: no gross musculoskeletal defects noted, no edema        Signed Electronically by: Karel Arellano MD    "

## 2024-04-30 LAB
ANION GAP SERPL CALCULATED.3IONS-SCNC: 13 MMOL/L (ref 7–15)
BUN SERPL-MCNC: 13.8 MG/DL (ref 6–20)
CALCIUM SERPL-MCNC: 9.6 MG/DL (ref 8.6–10)
CHLORIDE SERPL-SCNC: 101 MMOL/L (ref 98–107)
CHOLEST SERPL-MCNC: 241 MG/DL
CREAT SERPL-MCNC: 0.74 MG/DL (ref 0.67–1.17)
DEPRECATED HCO3 PLAS-SCNC: 24 MMOL/L (ref 22–29)
EGFRCR SERPLBLD CKD-EPI 2021: >90 ML/MIN/1.73M2
FASTING STATUS PATIENT QL REPORTED: NO
GLUCOSE SERPL-MCNC: 90 MG/DL (ref 70–99)
HDLC SERPL-MCNC: 49 MG/DL
LDLC SERPL CALC-MCNC: 155 MG/DL
NONHDLC SERPL-MCNC: 192 MG/DL
POTASSIUM SERPL-SCNC: 4.7 MMOL/L (ref 3.4–5.3)
SODIUM SERPL-SCNC: 138 MMOL/L (ref 135–145)
TRIGL SERPL-MCNC: 183 MG/DL

## 2024-06-03 ENCOUNTER — THERAPY VISIT (OUTPATIENT)
Dept: PHYSICAL THERAPY | Facility: CLINIC | Age: 39
End: 2024-06-03
Attending: FAMILY MEDICINE
Payer: COMMERCIAL

## 2024-06-03 DIAGNOSIS — G89.29 CHRONIC BILATERAL LOW BACK PAIN WITHOUT SCIATICA: Primary | ICD-10-CM

## 2024-06-03 DIAGNOSIS — M54.50 CHRONIC BILATERAL LOW BACK PAIN WITHOUT SCIATICA: Primary | ICD-10-CM

## 2024-06-03 DIAGNOSIS — R29.898 HIP TIGHTNESS: ICD-10-CM

## 2024-06-03 DIAGNOSIS — M54.50 BILATERAL LOW BACK PAIN WITHOUT SCIATICA, UNSPECIFIED CHRONICITY: ICD-10-CM

## 2024-06-03 PROCEDURE — 97110 THERAPEUTIC EXERCISES: CPT | Mod: GP | Performed by: PHYSICAL THERAPIST

## 2024-06-03 PROCEDURE — 97161 PT EVAL LOW COMPLEX 20 MIN: CPT | Mod: GP | Performed by: PHYSICAL THERAPIST

## 2024-06-03 PROCEDURE — 97530 THERAPEUTIC ACTIVITIES: CPT | Mod: GP | Performed by: PHYSICAL THERAPIST

## 2024-06-06 PROBLEM — G89.29 CHRONIC BILATERAL LOW BACK PAIN WITHOUT SCIATICA: Status: ACTIVE | Noted: 2024-06-06

## 2024-06-06 PROBLEM — M54.50 CHRONIC BILATERAL LOW BACK PAIN WITHOUT SCIATICA: Status: ACTIVE | Noted: 2024-06-06

## 2024-06-06 NOTE — PROGRESS NOTES
PHYSICAL THERAPY EVALUATION  Type of Visit: Evaluation    See electronic medical record for Abuse and Falls Screening details.    Subjective       Presenting condition or subjective complaint: Tight hips which may be contributing to lower back pain. Patient has had a hx of LBP episodes about 3 x year for years usually related to heavier lifting. They normally last a few days and go away on their own. The past few years since working from home he feels he has been more sedentary nd feels his back and hips are tighter. He still is able to run and bike 3-5 x week but is interested in learning a HEP he can do to improve his strength and mobility.  Date of onset: 23 (referral date)    Relevant medical history:     Past Medical History:   Diagnosis Date    Essential hypertension        Dates & types of surgery:    Past Surgical History:   Procedure Laterality Date    AS OPEN TX FRACTURE PHALANX/PHALANGES NOT GREAT TOE Right     Right 5th finger, may still have hardware in place         Prior diagnostic imaging/testing results:       Prior therapy history for the same diagnosis, illness or injury: No      Prior Level of Function  Transfers: Independent  Ambulation: Independent  ADL: Independent      Living Environment  Social support: With family members   Type of home: Apartment/condo   Stairs to enter the home: Yes 20 Is there a railing: Yes   Ramp: No   Stairs inside the home: No       Help at home: None  Equipment owned:       Employment: Yes   Hobbies/Interests: Running, cycling, reading, watching movies    Patient goals for therapy:      Pain assessment: Location: B LS area, B lateral hips/Ratin-2/10     Objective   LUMBAR SPINE EVALUATION  PAIN: Pain Level at Rest: 0/10  Pain Level with Use: 2/10  Pain is Exacerbated By: prolonged sitting, heavy lifting    POSTURE: WNL  GAIT:   Weightbearing Status: WBAT  Assistive Device(s): None  Gait Deviations: WNL  BALANCE/PROPRIOCEPTION:  WNL    NON-WEIGHTBEARING ALIGNMENT:  L ASIS slightly higher than R    ROM:  lumbar movements WNL; B hip ER 60  PELVIC/SI SCREEN:  SI joint provocation tests (-)  STRENGTH:  lower abdominals 4/5; B hip flex 5/5; abd 4-/5; ext 4/5    MYOTOMES: WNL  DERMATOMES: WNL  NEURAL TENSION:  slump (-); SLR (-) B  FLEXIBILITY:  slight tightness in B piriformis, QL  LUMBAR/HIP Special Tests:  FADIR (-) B; Bill's (-) B    PALPATION: WNL  SPINAL SEGMENTAL CONCLUSIONS: WNL      Assessment & Plan   CLINICAL IMPRESSIONS  Medical Diagnosis: LBP/B hip pain    Treatment Diagnosis: LBP/B hip pain   Impression/Assessment: Patient is a 39 year old male with low back and B hip complaints.  The following significant findings have been identified: Pain, Decreased ROM/flexibility, Decreased strength, Impaired muscle performance, and Decreased activity tolerance. These impairments interfere with their ability to perform recreational activities and household chores as compared to previous level of function.     Clinical Decision Making (Complexity):  Clinical Presentation: Stable/Uncomplicated  Clinical Presentation Rationale: based on medical and personal factors listed in PT evaluation  Clinical Decision Making (Complexity): Low complexity    PLAN OF CARE  Treatment Interventions:  Interventions: Therapeutic Activity, Therapeutic Exercise, Self-Care/Home Management    Long Term Goals     PT Goal 1  Goal Identifier: sitting  Goal Description: minutes patient will be able to sit at work without LBP - 120  Rationale: to maximize safety and independence with performance of ADLs and functional tasks (work requirements)  Target Date: 07/08/24      Frequency of Treatment: 2 x month  Duration of Treatment: 1 month      Education Assessment:   Learner/Method: Patient;Listening;Demonstration    Risks and benefits of evaluation/treatment have been explained.   Patient/Family/caregiver agrees with Plan of Care.     Evaluation Time:     PT Eval, Low  Complexity Minutes (90252): 17       Signing Clinician: Susan Baron, PT

## 2024-06-25 ENCOUNTER — THERAPY VISIT (OUTPATIENT)
Dept: PHYSICAL THERAPY | Facility: CLINIC | Age: 39
End: 2024-06-25
Payer: COMMERCIAL

## 2024-06-25 DIAGNOSIS — G89.29 CHRONIC BILATERAL LOW BACK PAIN WITHOUT SCIATICA: Primary | ICD-10-CM

## 2024-06-25 DIAGNOSIS — M54.50 CHRONIC BILATERAL LOW BACK PAIN WITHOUT SCIATICA: Primary | ICD-10-CM

## 2024-06-25 PROCEDURE — 97530 THERAPEUTIC ACTIVITIES: CPT | Mod: GP | Performed by: PHYSICAL THERAPIST

## 2024-06-25 PROCEDURE — 97110 THERAPEUTIC EXERCISES: CPT | Mod: GP | Performed by: PHYSICAL THERAPIST

## 2024-06-25 NOTE — PROGRESS NOTES
06/25/24 0500   Appointment Info   Signing clinician's name / credentials Susan Baron PT   Total/Authorized Visits 2 planned   Visits Used 2   Medical Diagnosis LBP/B hip pain   PT Tx Diagnosis LBP/B hip pain   Progress Note/Certification   Onset of illness/injury or Date of Surgery 04/29/24  (referral date)   Therapy Frequency 2 x month   Predicted Duration 1 month   PT Goal 1   Goal Identifier sitting   Goal Description minutes patient will be able to sit at work without LBP - 120   Rationale to maximize safety and independence with performance of ADLs and functional tasks  (work requirements)   Goal Progress patient can sit for 120 minutes without LBP   Target Date 07/08/24   Date Met 06/25/24   Subjective Report   Subjective Report Pt. has made good progress in PT with decreased low back and B hip pain. he has been doing the home exercises regularly and feels they really help. Pt. will continue his HEP with no further PT visits planned unless increased problems arise.   Objective Measures   Objective Measures Objective Measure 1;Objective Measure 2   Objective Measure 1   Objective Measure ROM   Details tightness in B piriformis; adductors   Objective Measure 2   Objective Measure strength   Details B hip abd 4+/5; ext 4+/5; ER 4+/5   Treatment Interventions (PT)   Interventions Therapeutic Procedure/Exercise;Therapeutic Activity   Therapeutic Procedure/Exercise   Therapeutic Procedures: strength, endurance, ROM, flexibility minutes (45803) 20   Therapeutic Procedures Ther Proc 2;Ther Proc 3;Ther Proc 4;Ther Proc 5;Ther Proc 6;Ther Proc 7;Ther Proc 8;Ther Proc 9;Ther Proc 10   Ther Proc 1 press ups   Ther Proc 1 - Details 5 sec x 10   Ther Proc 2 pretzel stretch   Ther Proc 2 - Details 30 sec x 2   Ther Proc 3 butterfly stretch   Ther Proc 3 - Details 30 sec x 2   Ther Proc 4 bridge with alt LE kicks   Ther Proc 4 - Details 10 reps   Ther Proc 5 supine abdom stab #9   Ther Proc 5 - Details 10 reps   Ther  Proc 6 standing hip abd   Ther Proc 6 - Details Gr TB x 10   Ther Proc 7 standig hip ext   Ther Proc 7 - Details Gr TB x 10   Skilled Intervention ther ex to improve strength and mobility - cueing for proper form/technique   Patient Response/Progress tolerated ex's well   Ther Proc 8 skc   Ther Proc 8 - Details 5 sec x 5   Ther Proc 9 LTR   Ther Proc 9 - Details 5 sec x 5   Ther Proc 10 plank   Ther Proc 10 - Details prone 20 sec x 2   Therapeutic Activity   Therapeutic Activities: dynamic activities to improve functional performance minutes (29833) 10   Ther Act 1 Cont educatiion in proper posture and body mechanics with his work station at home. Also instructed in strategies to incorporate movement into the work day.   Patient Response/Progress good understanding of information   Education   Learner/Method Patient;Listening;Demonstration   Plan   Plan for next session sidestepping, split squats   Total Session Time   Timed Code Treatment Minutes 30   Total Treatment Time (sum of timed and untimed services) 30         DISCHARGE  Reason for Discharge: Patient has met all goals.        Discharge Plan: Patient to continue home program.    Referring Provider:  Karel Arellano

## 2024-08-13 DIAGNOSIS — I10 ESSENTIAL HYPERTENSION: ICD-10-CM

## 2024-08-14 RX ORDER — LISINOPRIL 10 MG/1
10 TABLET ORAL DAILY
Qty: 90 TABLET | Refills: 0 | Status: SHIPPED | OUTPATIENT
Start: 2024-08-14

## 2024-08-14 RX ORDER — HYDROCHLOROTHIAZIDE 25 MG/1
25 TABLET ORAL DAILY
Qty: 90 TABLET | Refills: 0 | Status: SHIPPED | OUTPATIENT
Start: 2024-08-14

## 2024-08-14 NOTE — TELEPHONE ENCOUNTER
Medication requested: hydrochlorothiazide (HYDRODIURIL) 25 MG tablet   Last office visit: 4/29/24  [facility] appointments: none  Medication last refilled: 4/29/24; 90 + 0 refills  Last qualifying labs:   BP Readings from Last 3 Encounters:   04/29/24 117/81   05/11/23 117/80   04/28/23 122/84     Component      Latest Ref Rng 4/29/2024  3:58 PM   Sodium      135 - 145 mmol/L 138    Potassium      3.4 - 5.3 mmol/L 4.7      Component      Latest Ref Rng 4/29/2024  3:58 PM   GFR Estimate      >60 mL/min/1.73m2 >90      Medication requested: lisinopril (ZESTRIL) 10 MG tablet   Medication last refilled: 4/29/24; 90 + 0 refills  Last qualifying labs: See above    Prescription approved per [facility] Refill Protocol.    Bonilla HOPSON, RN  08/14/24 11:59 AM

## 2024-11-11 DIAGNOSIS — I10 ESSENTIAL HYPERTENSION: ICD-10-CM

## 2024-11-11 RX ORDER — HYDROCHLOROTHIAZIDE 25 MG/1
25 TABLET ORAL DAILY
Qty: 90 TABLET | Refills: 0 | Status: SHIPPED | OUTPATIENT
Start: 2024-11-11

## 2024-11-11 RX ORDER — LISINOPRIL 10 MG/1
10 TABLET ORAL DAILY
Qty: 90 TABLET | Refills: 0 | Status: SHIPPED | OUTPATIENT
Start: 2024-11-11

## 2024-11-11 NOTE — TELEPHONE ENCOUNTER
Hydrochlorothiazide (Hydrodiuril) 25 mg + Lisinopril (Zestril) 10 mg    Last Office Visit: 4/29/24  [facility] Appointments: None  Medication last refilled:     Medication last refilled:   8/14/24 #90 with 0 refill(s) - HCTZ  8/14/24 #90 with 0 refill(s) - Lisinopril    Vital Signs Systolic Diastolic   4/29/24 117 81   5/11/23 117 80   10/18/22 115 83     Required labs per protocol:    LAB REF RANGE 4/28/23 4/29/24   GFR >60 mL/min/1.73m2  >90 >90   Creatinine 0.67-1.17 mg/dL 0.79 0.74   Potassium 3.4-5.3 mmol/L 4.3 4.7   Sodium 137.3-146.3 mmol/L 138 138     Prescription approved per [facility] Refill Protocol.    IMER WhiteN, RN, CCM

## 2025-02-10 DIAGNOSIS — I10 ESSENTIAL HYPERTENSION: ICD-10-CM

## 2025-02-11 RX ORDER — HYDROCHLOROTHIAZIDE 25 MG/1
25 TABLET ORAL DAILY
Qty: 90 TABLET | Refills: 0 | Status: SHIPPED | OUTPATIENT
Start: 2025-02-11

## 2025-02-11 RX ORDER — LISINOPRIL 10 MG/1
10 TABLET ORAL DAILY
Qty: 90 TABLET | Refills: 0 | Status: SHIPPED | OUTPATIENT
Start: 2025-02-11

## 2025-02-11 NOTE — TELEPHONE ENCOUNTER
Medication requested: hydrochlorothiazide (HYDRODIURIL) 25 MG tablet   Last office visit: 4/29/24  [facility] appointments: none  Medication last refilled: 11/11/24; 90 + 0 refills  Last qualifying labs:   BP Readings from Last 3 Encounters:   04/29/24 117/81   05/11/23 117/80   04/28/23 122/84     Component      Latest Ref Rng 4/29/2024  3:58 PM   Sodium      135 - 145 mmol/L 138    Potassium      3.4 - 5.3 mmol/L 4.7      Component      Latest Ref Rng 4/29/2024  3:58 PM   GFR Estimate      >60 mL/min/1.73m2 >90      Medication requested: lisinopril (ZESTRIL) 10 MG tablet   Medication last refilled: 11/11/24; 90 + 0 refills  Last qualifying labs: see above    Prescription approved per [facility] Refill Protocol.    Bonilla HOPSON, RN  02/11/25 12:20 PM

## 2025-04-21 DIAGNOSIS — I10 ESSENTIAL HYPERTENSION: ICD-10-CM

## 2025-04-23 RX ORDER — LISINOPRIL 10 MG/1
10 TABLET ORAL DAILY
Qty: 90 TABLET | Refills: 0 | Status: SHIPPED | OUTPATIENT
Start: 2025-04-23

## 2025-04-23 RX ORDER — HYDROCHLOROTHIAZIDE 25 MG/1
25 TABLET ORAL DAILY
Qty: 90 TABLET | Refills: 0 | Status: SHIPPED | OUTPATIENT
Start: 2025-04-23

## 2025-04-23 NOTE — TELEPHONE ENCOUNTER
Medication requested: lisinopril (ZESTRIL) 10 MG tablet & hydrochlorothiazide (HYDRODIURIL) 25 MG tablet   Last office visit:   [facility] appointments: 5/2/2025  Medication last refilled: 2/11/2025; 90 tabs + 0 refills  Last qualifying labs:     BP Readings from Last 3 Encounters:   04/29/24 117/81   05/11/23 117/80   04/28/23 122/84     Component      Latest Ref Rng 4/29/2024  3:58 PM   Potassium      3.4 - 5.3 mmol/L 4.7      Component      Latest Ref Rng 4/29/2024  3:58 PM   GFR Estimate      >60 mL/min/1.73m2 >90      Medication is being filled for 1 time refill only due to:  Patient needs to be seen because due for annual physical.    MARK ANTHONY Champion, RN  04/23/25, 2:04 PM

## 2025-05-02 PROCEDURE — 82465 ASSAY BLD/SERUM CHOLESTEROL: CPT | Mod: ORL | Performed by: FAMILY MEDICINE

## 2025-05-02 PROCEDURE — 80048 BASIC METABOLIC PNL TOTAL CA: CPT | Mod: ORL | Performed by: FAMILY MEDICINE

## 2025-05-29 ASSESSMENT — ACTIVITIES OF DAILY LIVING (ADL)
SIT WITH YOUR KNEE BENT: ACTIVITY IS NOT DIFFICULT
PAIN: I HAVE THE SYMPTOM BUT IT DOES NOT AFFECT MY ACTIVITY
SWELLING: I DO NOT HAVE THE SYMPTOM
UEFI_TOTAL_SCORE: 76
WALK: ACTIVITY IS NOT DIFFICULT
DRIVING: NO DIFFICULTY
KNEE_ACTIVITY_OF_DAILY_LIVING_SCORE: 98.46
LIMPING: I DO NOT HAVE THE SYMPTOM
LIMPING: I DO NOT HAVE THE SYMPTOM
GO DOWN STAIRS: ACTIVITY IS NOT DIFFICULT
ANY_OF_YOUR_USUAL_WORK,_HOUSEWORK_OR_SCHOOL_ACTIVITIES: NO DIFFICULTY
KNEEL ON THE FRONT OF YOUR KNEE: ACTIVITY IS NOT DIFFICULT
GO UP STAIRS: ACTIVITY IS NOT DIFFICULT
HOW_WOULD_YOU_RATE_THE_CURRENT_FUNCTION_OF_YOUR_KNEE_DURING_YOUR_USUAL_DAILY_ACTIVITIES_ON_A_SCALE_FROM_0_TO_100_WITH_100_BEING_YOUR_LEVEL_OF_KNEE_FUNCTION_PRIOR_TO_YOUR_INJURY_AND_0_BEING_THE_INABILITY_TO_PERFORM_ANY_OF_YOUR_USUAL_DAILY_ACTIVITIES?: 100
STIFFNESS: I DO NOT HAVE THE SYMPTOM
SWELLING: I DO NOT HAVE THE SYMPTOM
PLEASE_INDICATE_YOR_PRIMARY_REASON_FOR_REFERRAL_TO_THERAPY:: ELBOW
HOW_WOULD_YOU_RATE_THE_OVERALL_FUNCTION_OF_YOUR_KNEE_DURING_YOUR_USUAL_DAILY_ACTIVITIES?: NORMAL
HOW_WOULD_YOU_RATE_THE_CURRENT_FUNCTION_OF_YOUR_KNEE_DURING_YOUR_USUAL_DAILY_ACTIVITIES_ON_A_SCALE_FROM_0_TO_100_WITH_100_BEING_YOUR_LEVEL_OF_KNEE_FUNCTION_PRIOR_TO_YOUR_INJURY_AND_0_BEING_THE_INABILITY_TO_PERFORM_ANY_OF_YOUR_USUAL_DAILY_ACTIVITIES?: 100
DOING_UP_BUTTONS: NO DIFFICULTY
CLEANING: NO DIFFICULTY
YOUR_USUAL_HOBBIES,_RECREATIONAL_OR_SPORTING_ACTIVITIES: NO DIFFICULTY
CARRYING_A_SMALL_SUITCASE_WITH_YOUR_AFFECTED_LIMB: NO DIFFICULTY
GO DOWN STAIRS: ACTIVITY IS NOT DIFFICULT
PREPARING_FOOD: NO DIFFICULTY
RISE FROM A CHAIR: ACTIVITY IS NOT DIFFICULT
SLEEPING: NO DIFFICULTY
THROWING_A_BALL: NO DIFFICULTY
DRESS: NO DIFFICULTY
PUSHING_UP_ON_YOUR_HANDS: NO DIFFICULTY
LAUNDERING_CLOTHES: NO DIFFICULTY
GIVING WAY, BUCKLING OR SHIFTING OF KNEE: I DO NOT HAVE THE SYMPTOM
GO UP STAIRS: ACTIVITY IS NOT DIFFICULT
TYING_OR_LACING_SHOES: NO DIFFICULTY
KNEEL ON THE FRONT OF YOUR KNEE: ACTIVITY IS NOT DIFFICULT
GIVING WAY, BUCKLING OR SHIFTING OF KNEE: I DO NOT HAVE THE SYMPTOM
LIFTING_A_BAG_OF_GROCERIES_TO_WAIST_LEVEL: NO DIFFICULTY
SIT WITH YOUR KNEE BENT: ACTIVITY IS NOT DIFFICULT
STAND: ACTIVITY IS NOT DIFFICULT
STAND: ACTIVITY IS NOT DIFFICULT
WEAKNESS: I DO NOT HAVE THE SYMPTOM
RISE FROM A CHAIR: ACTIVITY IS NOT DIFFICULT
WEAKNESS: I DO NOT HAVE THE SYMPTOM
PAIN: I HAVE THE SYMPTOM BUT IT DOES NOT AFFECT MY ACTIVITY
AS_A_RESULT_OF_YOUR_KNEE_INJURY,_HOW_WOULD_YOU_RATE_YOUR_CURRENT_LEVEL_OF_DAILY_ACTIVITY?: NORMAL
STIFFNESS: I DO NOT HAVE THE SYMPTOM
UEFI_TOTAL_SCORE/80: .95
USING_TOOLS_OR_APPLIANCES: NO DIFFICULTY
WASHING_YOUR_HAIR_OR_SCALP: NO DIFFICULTY
PLACING_AN_OBJECT_ONTO,_OR_REMOVING_IT_FROM_AN_OVERHEAD_SHELF: NO DIFFICULTY
KNEE_ACTIVITY_OF_DAILY_LIVING_SUM: 64
OPENING_DOORS: NO DIFFICULTY
WALK: ACTIVITY IS NOT DIFFICULT
PLEASE_INDICATE_YOR_PRIMARY_REASON_FOR_REFERRAL_TO_THERAPY:: KNEE
HOW_WOULD_YOU_RATE_THE_OVERALL_FUNCTION_OF_YOUR_KNEE_DURING_YOUR_USUAL_DAILY_ACTIVITIES?: NORMAL
OPENING_A_JAR: NO DIFFICULTY
AS_A_RESULT_OF_YOUR_KNEE_INJURY,_HOW_WOULD_YOU_RATE_YOUR_CURRENT_LEVEL_OF_DAILY_ACTIVITY?: NORMAL
VACUUMING,_SWEEPING,_OR_RAKING: NO DIFFICULTY
RAW_SCORE: 68.92

## 2025-06-03 ENCOUNTER — THERAPY VISIT (OUTPATIENT)
Dept: PHYSICAL THERAPY | Facility: CLINIC | Age: 40
End: 2025-06-03
Attending: FAMILY MEDICINE
Payer: COMMERCIAL

## 2025-06-03 DIAGNOSIS — M25.562 LEFT KNEE PAIN, UNSPECIFIED CHRONICITY: ICD-10-CM

## 2025-06-03 DIAGNOSIS — M25.562 ACUTE PAIN OF LEFT KNEE: Primary | ICD-10-CM

## 2025-06-03 PROCEDURE — 97161 PT EVAL LOW COMPLEX 20 MIN: CPT | Mod: GP | Performed by: PHYSICAL THERAPIST

## 2025-06-03 PROCEDURE — 97530 THERAPEUTIC ACTIVITIES: CPT | Mod: GP | Performed by: PHYSICAL THERAPIST

## 2025-06-03 NOTE — PROGRESS NOTES
PHYSICAL THERAPY EVALUATION  Type of Visit: Evaluation          Fall Risk Screen:  Have you fallen 2 or more times in the past year?: No  Have you fallen and had an injury in the past year?: No    Subjective         Presenting condition or subjective complaint: Some knee pain when twisting knee motions Pt is a 40 year old male presenting to PT with cheif complaint of left knee pain, this has been ongoing for ~3 months, no JUSTINE. He has been using a compressoin brace for pain management. He notes he first notied his knee pain onset when he was performing a supine figure-4 stretch, he also has increased knee pain with child's pose, and any GRIFFIN position of LE. He localizes his pain to his medial knee. Denies pertinent PMHx of previous knee injuries. Of note he has no knee pain with sqautting, running, or other daily activities besides those previously noted.   Date of onset: 03/01/25    Relevant medical history:   Past Medical History:   Diagnosis Date    Essential hypertension          Dates & types of surgery:    Past Surgical History:   Procedure Laterality Date    AS OPEN TX FRACTURE PHALANX/PHALANGES NOT GREAT TOE Right     Right 5th finger, may still have hardware in place         Prior diagnostic imaging/testing results: X-ray     Prior therapy history for the same diagnosis, illness or injury: No      Prior Level of Function  Transfers: Independent  Ambulation: Independent  ADL: Independent  IADL: Driving, Housekeeping, Work    Living Environment  Social support: With a significant other or spouse   Type of home: Apartment/condo   Stairs to enter the home: Yes 15     Ramp: No   Stairs inside the home: No       Help at home: None  Equipment owned:       Employment: Yes   Hobbies/Interests: Running/walking/reading/    Patient goals for therapy: Child s pose    Pain assessment: Pain denied     Objective   KNEE EVALUATION  PAIN: Pain Level with Use: 3/10  Pain Location: knee and medial  Pain is  Exacerbated By: child's pose, supine figure-4  INTEGUMENTARY (edema, incisions): WNL  POSTURE: WNL  GAIT:  Weightbearing Status: WBAT  Assistive Device(s): None  Gait Deviations: WNL  BALANCE/PROPRIOCEPTION: WNL  WEIGHTBEARING ALIGNMENT: WNL  NON-WEIGHTBEARING ALIGNMENT: WNL  ROM: AROM WNL    STRENGTH: WNL  FLEXIBILITY: WNL  SPECIAL TESTS: WNL McMurrays and dynamic McMurrays -, leg length -, Anterior drawer -, Posterior drawer -, varus/valgus stress tests -   FUNCTIONAL TESTS: Single Leg Squat: Anterior knee translation, Knee valgus, Hip internal rotation, and Improper use of glutes/hips  PALPATION: WNL  JOINT MOBILITY: WNL    Assessment & Plan   CLINICAL IMPRESSIONS  Medical Diagnosis: left knee pain    Treatment Diagnosis: left knee pain   Impression/Assessment: Patient is a 40 year old male with left knee complaints.  The following significant findings have been identified: Pain. These impairments interfere with their ability to perform recreational activities as compared to previous level of function.     Clinical Decision Making (Complexity):  Clinical Presentation: Stable/Uncomplicated  Clinical Presentation Rationale: based on medical and personal factors listed in PT evaluation  Clinical Decision Making (Complexity): Low complexity    PLAN OF CARE  Treatment Interventions:  Interventions: Therapeutic Activity    Long Term Goals     PT Goal 1  Goal Identifier: pain  Goal Description: 0/10, even with previously provocative manuevers (child's pose)  Rationale: to maximize safety and independence with performance of ADLs and functional tasks  Target Date: 07/03/25      Frequency of Treatment: prn  Duration of Treatment: 2 months    Education Assessment:   Learner/Method: Patient;Listening;No Barriers to Learning    Risks and benefits of evaluation/treatment have been explained.   Patient/Family/caregiver agrees with Plan of Care.     Evaluation Time:     PT Eval, Low Complexity Minutes (39391): 20  Evaluation  Only     Signing Clinician: Susan Baron, PT          P